# Patient Record
Sex: MALE | Race: WHITE | NOT HISPANIC OR LATINO | Employment: OTHER | URBAN - METROPOLITAN AREA
[De-identification: names, ages, dates, MRNs, and addresses within clinical notes are randomized per-mention and may not be internally consistent; named-entity substitution may affect disease eponyms.]

---

## 2017-09-27 ENCOUNTER — OUTSIDE PLACE OF SERVICE (OUTPATIENT)
Dept: ADMINISTRATIVE | Facility: OTHER | Age: 67
End: 2017-09-27
Payer: OTHER GOVERNMENT

## 2017-09-27 PROCEDURE — 99305 1ST NF CARE MODERATE MDM 35: CPT | Mod: ,,, | Performed by: FAMILY MEDICINE

## 2017-10-25 ENCOUNTER — OUTSIDE PLACE OF SERVICE (OUTPATIENT)
Dept: ADMINISTRATIVE | Facility: OTHER | Age: 67
End: 2017-10-25
Payer: OTHER GOVERNMENT

## 2017-10-25 PROCEDURE — 99308 SBSQ NF CARE LOW MDM 20: CPT | Mod: ,,, | Performed by: FAMILY MEDICINE

## 2017-11-10 ENCOUNTER — OUTSIDE PLACE OF SERVICE (OUTPATIENT)
Dept: ADMINISTRATIVE | Facility: OTHER | Age: 67
End: 2017-11-10
Payer: OTHER GOVERNMENT

## 2017-11-10 PROCEDURE — 99308 SBSQ NF CARE LOW MDM 20: CPT | Mod: ,,, | Performed by: FAMILY MEDICINE

## 2018-09-08 ENCOUNTER — OUTSIDE PLACE OF SERVICE (OUTPATIENT)
Dept: CARDIOLOGY | Facility: CLINIC | Age: 68
End: 2018-09-08
Payer: MEDICARE

## 2018-09-08 PROCEDURE — 93010 ELECTROCARDIOGRAM REPORT: CPT | Mod: S$GLB,,, | Performed by: INTERNAL MEDICINE

## 2019-01-17 ENCOUNTER — OUTSIDE PLACE OF SERVICE (OUTPATIENT)
Dept: ADMINISTRATIVE | Facility: OTHER | Age: 69
End: 2019-01-17
Payer: MEDICARE

## 2019-01-17 PROCEDURE — 99309 SBSQ NF CARE MODERATE MDM 30: CPT | Mod: ,,, | Performed by: FAMILY MEDICINE

## 2019-01-17 PROCEDURE — 99309 PR NURSING FAC CARE, SUBSEQ, SIGNIF COMPLIC: ICD-10-PCS | Mod: ,,, | Performed by: FAMILY MEDICINE

## 2019-01-30 ENCOUNTER — OUTSIDE PLACE OF SERVICE (OUTPATIENT)
Dept: ADMINISTRATIVE | Facility: OTHER | Age: 69
End: 2019-01-30
Payer: MEDICARE

## 2019-01-30 PROCEDURE — 99307 SBSQ NF CARE SF MDM 10: CPT | Mod: ,,, | Performed by: FAMILY MEDICINE

## 2019-01-30 PROCEDURE — 99307 PR NURSING FAC CARE, SUBSEQ, IMPROVING: ICD-10-PCS | Mod: ,,, | Performed by: FAMILY MEDICINE

## 2019-03-27 ENCOUNTER — HOSPITAL ENCOUNTER (INPATIENT)
Facility: HOSPITAL | Age: 69
LOS: 6 days | Discharge: HOME | DRG: 246 | End: 2019-04-02
Attending: EMERGENCY MEDICINE | Admitting: STUDENT IN AN ORGANIZED HEALTH CARE EDUCATION/TRAINING PROGRAM
Payer: MEDICARE

## 2019-03-27 ENCOUNTER — OUTSIDE PLACE OF SERVICE (OUTPATIENT)
Dept: FAMILY MEDICINE | Facility: CLINIC | Age: 69
End: 2019-03-27
Payer: MEDICARE

## 2019-03-27 DIAGNOSIS — J90 PLEURAL EFFUSION: Primary | ICD-10-CM

## 2019-03-27 DIAGNOSIS — E87.6 HYPOKALEMIA: ICD-10-CM

## 2019-03-27 DIAGNOSIS — I21.3 ST ELEVATION MYOCARDIAL INFARCTION (STEMI), UNSPECIFIED ARTERY: ICD-10-CM

## 2019-03-27 DIAGNOSIS — I50.41 ACUTE COMBINED SYSTOLIC AND DIASTOLIC HEART FAILURE: ICD-10-CM

## 2019-03-27 DIAGNOSIS — I21.3 STEMI (ST ELEVATION MYOCARDIAL INFARCTION): ICD-10-CM

## 2019-03-27 DIAGNOSIS — I21.02 ST ELEVATION MYOCARDIAL INFARCTION INVOLVING LEFT ANTERIOR DESCENDING (LAD) CORONARY ARTERY: ICD-10-CM

## 2019-03-27 DIAGNOSIS — R07.9 CHEST PAIN: ICD-10-CM

## 2019-03-27 DIAGNOSIS — R00.0 TACHYCARDIA: ICD-10-CM

## 2019-03-27 DIAGNOSIS — D72.829 LEUKOCYTOSIS, UNSPECIFIED TYPE: ICD-10-CM

## 2019-03-27 DIAGNOSIS — R41.0 DELIRIOUS: ICD-10-CM

## 2019-03-27 DIAGNOSIS — I25.110 CORONARY ARTERY DISEASE INVOLVING NATIVE CORONARY ARTERY OF NATIVE HEART WITH UNSTABLE ANGINA PECTORIS: ICD-10-CM

## 2019-03-27 DIAGNOSIS — J96.01 ACUTE RESPIRATORY FAILURE WITH HYPOXIA: ICD-10-CM

## 2019-03-27 DIAGNOSIS — R07.9 CHEST PAIN, UNSPECIFIED TYPE: ICD-10-CM

## 2019-03-27 LAB
ALBUMIN SERPL BCP-MCNC: 3.2 G/DL (ref 3.5–5.2)
ALP SERPL-CCNC: 76 U/L (ref 55–135)
ALT SERPL W/O P-5'-P-CCNC: 18 U/L (ref 10–44)
ANION GAP SERPL CALC-SCNC: 12 MMOL/L (ref 8–16)
AST SERPL-CCNC: 29 U/L (ref 10–40)
BASOPHILS # BLD AUTO: 0.11 K/UL (ref 0–0.2)
BASOPHILS NFR BLD: 0.7 % (ref 0–1.9)
BILIRUB SERPL-MCNC: 1.1 MG/DL (ref 0.1–1)
BNP SERPL-MCNC: 2283 PG/ML (ref 0–99)
BUN SERPL-MCNC: 19 MG/DL (ref 8–23)
CALCIUM SERPL-MCNC: 8.7 MG/DL (ref 8.7–10.5)
CHLORIDE SERPL-SCNC: 102 MMOL/L (ref 95–110)
CO2 SERPL-SCNC: 24 MMOL/L (ref 23–29)
CREAT SERPL-MCNC: 1.1 MG/DL (ref 0.5–1.4)
DIFFERENTIAL METHOD: ABNORMAL
EOSINOPHIL # BLD AUTO: 1.4 K/UL (ref 0–0.5)
EOSINOPHIL NFR BLD: 9.1 % (ref 0–8)
ERYTHROCYTE [DISTWIDTH] IN BLOOD BY AUTOMATED COUNT: 16.2 % (ref 11.5–14.5)
EST. GFR  (AFRICAN AMERICAN): >60 ML/MIN/1.73 M^2
EST. GFR  (NON AFRICAN AMERICAN): >60 ML/MIN/1.73 M^2
GIANT PLATELETS BLD QL SMEAR: PRESENT
GLUCOSE SERPL-MCNC: 148 MG/DL (ref 70–110)
HCT VFR BLD AUTO: 33.5 % (ref 40–54)
HGB BLD-MCNC: 10.5 G/DL (ref 14–18)
INFLUENZA A, MOLECULAR: NEGATIVE
INFLUENZA B, MOLECULAR: NEGATIVE
LACTATE SERPL-SCNC: 1.9 MMOL/L (ref 0.5–2.2)
LYMPHOCYTES # BLD AUTO: 1.2 K/UL (ref 1–4.8)
LYMPHOCYTES NFR BLD: 8.1 % (ref 18–48)
MAGNESIUM SERPL-MCNC: 2 MG/DL (ref 1.6–2.6)
MCH RBC QN AUTO: 32.6 PG (ref 27–31)
MCHC RBC AUTO-ENTMCNC: 31.3 G/DL (ref 32–36)
MCV RBC AUTO: 104 FL (ref 82–98)
MONOCYTES # BLD AUTO: 1.1 K/UL (ref 0.3–1)
MONOCYTES NFR BLD: 7.4 % (ref 4–15)
NEUTROPHILS # BLD AUTO: 10.9 K/UL (ref 1.8–7.7)
NEUTROPHILS NFR BLD: 74.7 % (ref 38–73)
NRBC BLD-RTO: 0 /100 WBC
PHOSPHATE SERPL-MCNC: 3.2 MG/DL (ref 2.7–4.5)
PLATELET # BLD AUTO: 264 K/UL (ref 150–350)
PMV BLD AUTO: 12.4 FL (ref 9.2–12.9)
POTASSIUM SERPL-SCNC: 3.8 MMOL/L (ref 3.5–5.1)
PROT SERPL-MCNC: 7.4 G/DL (ref 6–8.4)
RBC # BLD AUTO: 3.22 M/UL (ref 4.6–6.2)
SODIUM SERPL-SCNC: 138 MMOL/L (ref 136–145)
SPECIMEN SOURCE: NORMAL
TROPONIN I SERPL DL<=0.01 NG/ML-MCNC: 15.44 NG/ML (ref 0–0.03)
TROPONIN I SERPL DL<=0.01 NG/ML-MCNC: 16.03 NG/ML (ref 0–0.03)
WBC # BLD AUTO: 14.83 K/UL (ref 3.9–12.7)

## 2019-03-27 PROCEDURE — 25000003 PHARM REV CODE 250: Performed by: STUDENT IN AN ORGANIZED HEALTH CARE EDUCATION/TRAINING PROGRAM

## 2019-03-27 PROCEDURE — 83735 ASSAY OF MAGNESIUM: CPT

## 2019-03-27 PROCEDURE — 92928 PRQ TCAT PLMT NTRAC ST 1 LES: CPT | Mod: LD | Performed by: STUDENT IN AN ORGANIZED HEALTH CARE EDUCATION/TRAINING PROGRAM

## 2019-03-27 PROCEDURE — C1753 CATH, INTRAVAS ULTRASOUND: HCPCS | Performed by: STUDENT IN AN ORGANIZED HEALTH CARE EDUCATION/TRAINING PROGRAM

## 2019-03-27 PROCEDURE — 92928 PR STENT: ICD-10-PCS | Mod: LD,,, | Performed by: STUDENT IN AN ORGANIZED HEALTH CARE EDUCATION/TRAINING PROGRAM

## 2019-03-27 PROCEDURE — 83605 ASSAY OF LACTIC ACID: CPT

## 2019-03-27 PROCEDURE — 99308 PR NURSING FAC CARE, SUBSEQ, MINOR COMPLIC: ICD-10-PCS | Mod: ,,, | Performed by: FAMILY MEDICINE

## 2019-03-27 PROCEDURE — C1725 CATH, TRANSLUMIN NON-LASER: HCPCS | Performed by: STUDENT IN AN ORGANIZED HEALTH CARE EDUCATION/TRAINING PROGRAM

## 2019-03-27 PROCEDURE — 36415 COLL VENOUS BLD VENIPUNCTURE: CPT

## 2019-03-27 PROCEDURE — 20000000 HC ICU ROOM

## 2019-03-27 PROCEDURE — C1894 INTRO/SHEATH, NON-LASER: HCPCS | Performed by: STUDENT IN AN ORGANIZED HEALTH CARE EDUCATION/TRAINING PROGRAM

## 2019-03-27 PROCEDURE — 93010 ELECTROCARDIOGRAM REPORT: CPT | Mod: ,,, | Performed by: STUDENT IN AN ORGANIZED HEALTH CARE EDUCATION/TRAINING PROGRAM

## 2019-03-27 PROCEDURE — 99152 PR MOD CONSCIOUS SEDATION, SAME PHYS, 5+ YRS, FIRST 15 MIN: ICD-10-PCS | Mod: ,,, | Performed by: STUDENT IN AN ORGANIZED HEALTH CARE EDUCATION/TRAINING PROGRAM

## 2019-03-27 PROCEDURE — 99223 1ST HOSP IP/OBS HIGH 75: CPT | Mod: 25,,, | Performed by: STUDENT IN AN ORGANIZED HEALTH CARE EDUCATION/TRAINING PROGRAM

## 2019-03-27 PROCEDURE — 93005 ELECTROCARDIOGRAM TRACING: CPT

## 2019-03-27 PROCEDURE — 99223 PR INITIAL HOSPITAL CARE,LEVL III: ICD-10-PCS | Mod: 25,,, | Performed by: STUDENT IN AN ORGANIZED HEALTH CARE EDUCATION/TRAINING PROGRAM

## 2019-03-27 PROCEDURE — C1769 GUIDE WIRE: HCPCS | Performed by: STUDENT IN AN ORGANIZED HEALTH CARE EDUCATION/TRAINING PROGRAM

## 2019-03-27 PROCEDURE — 85025 COMPLETE CBC W/AUTO DIFF WBC: CPT

## 2019-03-27 PROCEDURE — 99308 SBSQ NF CARE LOW MDM 20: CPT | Mod: ,,, | Performed by: FAMILY MEDICINE

## 2019-03-27 PROCEDURE — 83880 ASSAY OF NATRIURETIC PEPTIDE: CPT

## 2019-03-27 PROCEDURE — 99152 MOD SED SAME PHYS/QHP 5/>YRS: CPT | Mod: ,,, | Performed by: STUDENT IN AN ORGANIZED HEALTH CARE EDUCATION/TRAINING PROGRAM

## 2019-03-27 PROCEDURE — 27201423 OPTIME MED/SURG SUP & DEVICES STERILE SUPPLY: Performed by: STUDENT IN AN ORGANIZED HEALTH CARE EDUCATION/TRAINING PROGRAM

## 2019-03-27 PROCEDURE — 93458 L HRT ARTERY/VENTRICLE ANGIO: CPT | Mod: 26,59,, | Performed by: STUDENT IN AN ORGANIZED HEALTH CARE EDUCATION/TRAINING PROGRAM

## 2019-03-27 PROCEDURE — 92928 PRQ TCAT PLMT NTRAC ST 1 LES: CPT | Mod: LD,,, | Performed by: STUDENT IN AN ORGANIZED HEALTH CARE EDUCATION/TRAINING PROGRAM

## 2019-03-27 PROCEDURE — 99900035 HC TECH TIME PER 15 MIN (STAT)

## 2019-03-27 PROCEDURE — 93458 L HRT ARTERY/VENTRICLE ANGIO: CPT | Performed by: STUDENT IN AN ORGANIZED HEALTH CARE EDUCATION/TRAINING PROGRAM

## 2019-03-27 PROCEDURE — 84100 ASSAY OF PHOSPHORUS: CPT

## 2019-03-27 PROCEDURE — 80053 COMPREHEN METABOLIC PANEL: CPT

## 2019-03-27 PROCEDURE — 25000003 PHARM REV CODE 250: Performed by: EMERGENCY MEDICINE

## 2019-03-27 PROCEDURE — C1887 CATHETER, GUIDING: HCPCS | Performed by: STUDENT IN AN ORGANIZED HEALTH CARE EDUCATION/TRAINING PROGRAM

## 2019-03-27 PROCEDURE — C1874 STENT, COATED/COV W/DEL SYS: HCPCS | Performed by: STUDENT IN AN ORGANIZED HEALTH CARE EDUCATION/TRAINING PROGRAM

## 2019-03-27 PROCEDURE — 93010 EKG 12-LEAD: ICD-10-PCS | Mod: ,,, | Performed by: STUDENT IN AN ORGANIZED HEALTH CARE EDUCATION/TRAINING PROGRAM

## 2019-03-27 PROCEDURE — 87502 INFLUENZA DNA AMP PROBE: CPT

## 2019-03-27 PROCEDURE — 87040 BLOOD CULTURE FOR BACTERIA: CPT

## 2019-03-27 PROCEDURE — 27000190 HC CPAP FULL FACE MASK W/VALVE

## 2019-03-27 PROCEDURE — 93458 PR CATH PLACE/CORON ANGIO, IMG SUPER/INTERP,W LEFT HEART VENTRICULOGRAPHY: ICD-10-PCS | Mod: 26,59,, | Performed by: STUDENT IN AN ORGANIZED HEALTH CARE EDUCATION/TRAINING PROGRAM

## 2019-03-27 PROCEDURE — 84484 ASSAY OF TROPONIN QUANT: CPT | Mod: 91

## 2019-03-27 PROCEDURE — 63600175 PHARM REV CODE 636 W HCPCS: Performed by: STUDENT IN AN ORGANIZED HEALTH CARE EDUCATION/TRAINING PROGRAM

## 2019-03-27 PROCEDURE — 99291 CRITICAL CARE FIRST HOUR: CPT

## 2019-03-27 DEVICE — XIENCE SIERRA™ EVEROLIMUS ELUTING CORONARY STENT SYSTEM 3.25 MM X 33 MM / RAPID-EXCHANGE
Type: IMPLANTABLE DEVICE | Site: CORONARY | Status: FUNCTIONAL
Brand: XIENCE SIERRA™

## 2019-03-27 RX ORDER — FUROSEMIDE 10 MG/ML
INJECTION INTRAMUSCULAR; INTRAVENOUS
Status: DISCONTINUED | OUTPATIENT
Start: 2019-03-27 | End: 2019-03-27 | Stop reason: HOSPADM

## 2019-03-27 RX ORDER — VANCOMYCIN HCL IN 5 % DEXTROSE 1G/250ML
15 PLASTIC BAG, INJECTION (ML) INTRAVENOUS
Status: ACTIVE | OUTPATIENT
Start: 2019-03-27 | End: 2019-03-28

## 2019-03-27 RX ORDER — LORAZEPAM 1 MG/1
1 TABLET ORAL EVERY 6 HOURS PRN
Status: DISCONTINUED | OUTPATIENT
Start: 2019-03-28 | End: 2019-03-27

## 2019-03-27 RX ORDER — FENTANYL CITRATE 50 UG/ML
INJECTION, SOLUTION INTRAMUSCULAR; INTRAVENOUS
Status: DISCONTINUED | OUTPATIENT
Start: 2019-03-27 | End: 2019-03-27 | Stop reason: HOSPADM

## 2019-03-27 RX ORDER — PHENYLEPHRINE HCL IN 0.9% NACL 1 MG/10 ML
SYRINGE (ML) INTRAVENOUS
Status: DISCONTINUED | OUTPATIENT
Start: 2019-03-27 | End: 2019-03-27 | Stop reason: HOSPADM

## 2019-03-27 RX ORDER — ALPRAZOLAM 0.25 MG/1
0.5 TABLET ORAL EVERY 6 HOURS PRN
Status: DISCONTINUED | OUTPATIENT
Start: 2019-03-27 | End: 2019-03-28

## 2019-03-27 RX ORDER — ACETAMINOPHEN 325 MG/1
650 TABLET ORAL EVERY 6 HOURS PRN
Status: DISCONTINUED | OUTPATIENT
Start: 2019-03-28 | End: 2019-03-28

## 2019-03-27 RX ORDER — MIDAZOLAM HYDROCHLORIDE 1 MG/ML
INJECTION, SOLUTION INTRAMUSCULAR; INTRAVENOUS
Status: DISCONTINUED | OUTPATIENT
Start: 2019-03-27 | End: 2019-03-27 | Stop reason: HOSPADM

## 2019-03-27 RX ORDER — HYDRALAZINE HYDROCHLORIDE 20 MG/ML
10 INJECTION INTRAMUSCULAR; INTRAVENOUS EVERY 6 HOURS PRN
Status: DISCONTINUED | OUTPATIENT
Start: 2019-03-27 | End: 2019-04-02 | Stop reason: HOSPADM

## 2019-03-27 RX ORDER — ATORVASTATIN CALCIUM 40 MG/1
40 TABLET, FILM COATED ORAL DAILY
Status: DISCONTINUED | OUTPATIENT
Start: 2019-03-28 | End: 2019-04-02 | Stop reason: HOSPADM

## 2019-03-27 RX ORDER — ACETAMINOPHEN 325 MG/1
650 TABLET ORAL EVERY 4 HOURS PRN
Status: DISCONTINUED | OUTPATIENT
Start: 2019-03-27 | End: 2019-03-27

## 2019-03-27 RX ORDER — ZOLPIDEM TARTRATE 5 MG/1
5 TABLET ORAL NIGHTLY PRN
Status: DISCONTINUED | OUTPATIENT
Start: 2019-03-28 | End: 2019-03-28

## 2019-03-27 RX ORDER — NAPROXEN SODIUM 220 MG/1
81 TABLET, FILM COATED ORAL DAILY
Status: DISCONTINUED | OUTPATIENT
Start: 2019-03-28 | End: 2019-04-02 | Stop reason: HOSPADM

## 2019-03-27 RX ORDER — ONDANSETRON 2 MG/ML
4 INJECTION INTRAMUSCULAR; INTRAVENOUS EVERY 12 HOURS PRN
Status: DISCONTINUED | OUTPATIENT
Start: 2019-03-27 | End: 2019-04-02 | Stop reason: HOSPADM

## 2019-03-27 RX ORDER — HEPARIN SODIUM 1000 [USP'U]/ML
INJECTION, SOLUTION INTRAVENOUS; SUBCUTANEOUS
Status: DISCONTINUED | OUTPATIENT
Start: 2019-03-27 | End: 2019-03-27 | Stop reason: HOSPADM

## 2019-03-27 RX ADMIN — TICAGRELOR 180 MG: 90 TABLET ORAL at 09:03

## 2019-03-27 RX ADMIN — SODIUM CHLORIDE 500 ML: 0.9 INJECTION, SOLUTION INTRAVENOUS at 09:03

## 2019-03-27 RX ADMIN — ALPRAZOLAM 0.5 MG: 0.25 TABLET ORAL at 11:03

## 2019-03-27 RX ADMIN — ZOLPIDEM TARTRATE 5 MG: 5 TABLET ORAL at 11:03

## 2019-03-27 NOTE — Clinical Note
Catheter  proximal left anterior descending artery. The vessel(s) were injected and visualized in multiple views.

## 2019-03-27 NOTE — Clinical Note
Catheter is inserted into the left ventricle. Hand injected The vessel(s) were injected and visualized in multiple views. Hemodynamics performed.

## 2019-03-27 NOTE — Clinical Note
Catheter is inserted into the distal left anterior descending artery. The vessel(s) were injected and visualized in multiple views.

## 2019-03-28 DIAGNOSIS — R07.9 CHEST PAIN, UNSPECIFIED TYPE: Primary | ICD-10-CM

## 2019-03-28 PROBLEM — I25.10 CORONARY ARTERY DISEASE INVOLVING NATIVE CORONARY ARTERY: Status: ACTIVE | Noted: 2019-03-28

## 2019-03-28 PROBLEM — E87.6 HYPOKALEMIA: Status: ACTIVE | Noted: 2019-03-28

## 2019-03-28 PROBLEM — R00.0 TACHYCARDIA: Status: ACTIVE | Noted: 2019-03-28

## 2019-03-28 PROBLEM — I21.3 STEMI (ST ELEVATION MYOCARDIAL INFARCTION): Status: ACTIVE | Noted: 2019-03-28

## 2019-03-28 PROBLEM — D72.829 LEUKOCYTOSIS: Status: ACTIVE | Noted: 2019-03-28

## 2019-03-28 PROBLEM — J96.00 ACUTE RESPIRATORY FAILURE: Status: ACTIVE | Noted: 2019-03-28

## 2019-03-28 LAB
ALLENS TEST: ABNORMAL
ANION GAP SERPL CALC-SCNC: 13 MMOL/L (ref 8–16)
AV INDEX (PROSTH): 0.61
AV MEAN GRADIENT: 2.96 MMHG
AV PEAK GRADIENT: 4 MMHG
AV VALVE AREA: 1.7 CM2
AV VELOCITY RATIO: 0.6
BASOPHILS # BLD AUTO: 0.1 K/UL (ref 0–0.2)
BASOPHILS NFR BLD: 0.5 % (ref 0–1.9)
BSA FOR ECHO PROCEDURE: 1.77 M2
BUN SERPL-MCNC: 18 MG/DL (ref 8–23)
CALCIUM SERPL-MCNC: 8.8 MG/DL (ref 8.7–10.5)
CHLORIDE SERPL-SCNC: 102 MMOL/L (ref 95–110)
CO2 SERPL-SCNC: 22 MMOL/L (ref 23–29)
CREAT SERPL-MCNC: 1 MG/DL (ref 0.5–1.4)
CV ECHO LV RWT: 0.36 CM
DELSYS: ABNORMAL
DIFFERENTIAL METHOD: ABNORMAL
DOP CALC AO PEAK VEL: 1 M/S
DOP CALC AO VTI: 17.16 CM
DOP CALC LVOT AREA: 2.77 CM2
DOP CALC LVOT DIAMETER: 1.88 CM
DOP CALC LVOT PEAK VEL: 0.6 M/S
DOP CALC LVOT STROKE VOLUME: 29.22 CM3
DOP CALCLVOT PEAK VEL VTI: 10.53 CM
ECHO LV POSTERIOR WALL: 0.91 CM (ref 0.6–1.1)
EOSINOPHIL # BLD AUTO: 0.5 K/UL (ref 0–0.5)
EOSINOPHIL NFR BLD: 2.3 % (ref 0–8)
ERYTHROCYTE [DISTWIDTH] IN BLOOD BY AUTOMATED COUNT: 16.1 % (ref 11.5–14.5)
EST. GFR  (AFRICAN AMERICAN): >60 ML/MIN/1.73 M^2
EST. GFR  (NON AFRICAN AMERICAN): >60 ML/MIN/1.73 M^2
FRACTIONAL SHORTENING: 2 % (ref 28–44)
GLUCOSE SERPL-MCNC: 190 MG/DL (ref 70–110)
HCO3 UR-SCNC: 24.2 MMOL/L (ref 24–28)
HCT VFR BLD AUTO: 34.1 % (ref 40–54)
HGB BLD-MCNC: 10.8 G/DL (ref 14–18)
INTERVENTRICULAR SEPTUM: 1.05 CM (ref 0.6–1.1)
IVRT: 0.1 MSEC
LA MAJOR: 5.33 CM
LA MINOR: 5.44 CM
LA WIDTH: 4.4 CM
LACTATE SERPL-SCNC: 1.8 MMOL/L (ref 0.5–2.2)
LEFT ATRIUM SIZE: 4.49 CM
LEFT ATRIUM VOLUME INDEX: 50.6 ML/M2
LEFT ATRIUM VOLUME: 90.42 CM3
LEFT INTERNAL DIMENSION IN SYSTOLE: 4.88 CM (ref 2.1–4)
LEFT VENTRICLE DIASTOLIC VOLUME INDEX: 66.18 ML/M2
LEFT VENTRICLE DIASTOLIC VOLUME: 118.18 ML
LEFT VENTRICLE MASS INDEX: 99.2 G/M2
LEFT VENTRICLE SYSTOLIC VOLUME INDEX: 62.6 ML/M2
LEFT VENTRICLE SYSTOLIC VOLUME: 111.71 ML
LEFT VENTRICULAR INTERNAL DIMENSION IN DIASTOLE: 5 CM (ref 3.5–6)
LEFT VENTRICULAR MASS: 177.11 G
LYMPHOCYTES # BLD AUTO: 1.3 K/UL (ref 1–4.8)
LYMPHOCYTES NFR BLD: 6.4 % (ref 18–48)
MCH RBC QN AUTO: 32.5 PG (ref 27–31)
MCHC RBC AUTO-ENTMCNC: 31.7 G/DL (ref 32–36)
MCV RBC AUTO: 103 FL (ref 82–98)
MODE: ABNORMAL
MONOCYTES # BLD AUTO: 0.8 K/UL (ref 0.3–1)
MONOCYTES NFR BLD: 4.2 % (ref 4–15)
NEUTROPHILS # BLD AUTO: 16.9 K/UL (ref 1.8–7.7)
NEUTROPHILS NFR BLD: 86 % (ref 38–73)
PCO2 BLDA: 30.3 MMHG (ref 35–45)
PH SMN: 7.51 [PH] (ref 7.35–7.45)
PISA TR MAX VEL: 2.36 M/S
PLATELET # BLD AUTO: 275 K/UL (ref 150–350)
PMV BLD AUTO: 12.3 FL (ref 9.2–12.9)
PO2 BLDA: 116 MMHG (ref 80–100)
POC BE: 1 MMOL/L
POC SATURATED O2: 99 % (ref 95–100)
POC TCO2: 25 MMOL/L (ref 23–27)
POTASSIUM SERPL-SCNC: 3.3 MMOL/L (ref 3.5–5.1)
PULM VEIN S/D RATIO: 1.1
PV PEAK D VEL: 0.21 M/S
PV PEAK S VEL: 0.23 M/S
RA MAJOR: 3.84 CM
RA PRESSURE: 3 MMHG
RBC # BLD AUTO: 3.32 M/UL (ref 4.6–6.2)
SAMPLE: ABNORMAL
SITE: ABNORMAL
SODIUM SERPL-SCNC: 137 MMOL/L (ref 136–145)
TDI LATERAL: 0.06
TR MAX PG: 22.28 MMHG
TV REST PULMONARY ARTERY PRESSURE: 25 MMHG
WBC # BLD AUTO: 19.69 K/UL (ref 3.9–12.7)

## 2019-03-28 PROCEDURE — 93005 ELECTROCARDIOGRAM TRACING: CPT

## 2019-03-28 PROCEDURE — 94640 AIRWAY INHALATION TREATMENT: CPT

## 2019-03-28 PROCEDURE — 83605 ASSAY OF LACTIC ACID: CPT

## 2019-03-28 PROCEDURE — 82803 BLOOD GASES ANY COMBINATION: CPT

## 2019-03-28 PROCEDURE — 94761 N-INVAS EAR/PLS OXIMETRY MLT: CPT

## 2019-03-28 PROCEDURE — 99900035 HC TECH TIME PER 15 MIN (STAT)

## 2019-03-28 PROCEDURE — 20000000 HC ICU ROOM

## 2019-03-28 PROCEDURE — 85025 COMPLETE CBC W/AUTO DIFF WBC: CPT

## 2019-03-28 PROCEDURE — 99233 SBSQ HOSP IP/OBS HIGH 50: CPT | Mod: 25,,, | Performed by: STUDENT IN AN ORGANIZED HEALTH CARE EDUCATION/TRAINING PROGRAM

## 2019-03-28 PROCEDURE — 93010 ELECTROCARDIOGRAM REPORT: CPT | Mod: 76,,, | Performed by: STUDENT IN AN ORGANIZED HEALTH CARE EDUCATION/TRAINING PROGRAM

## 2019-03-28 PROCEDURE — 93010 ELECTROCARDIOGRAM REPORT: CPT | Mod: ,,, | Performed by: STUDENT IN AN ORGANIZED HEALTH CARE EDUCATION/TRAINING PROGRAM

## 2019-03-28 PROCEDURE — 36415 COLL VENOUS BLD VENIPUNCTURE: CPT

## 2019-03-28 PROCEDURE — 63600175 PHARM REV CODE 636 W HCPCS: Performed by: EMERGENCY MEDICINE

## 2019-03-28 PROCEDURE — 63600175 PHARM REV CODE 636 W HCPCS: Performed by: NURSE PRACTITIONER

## 2019-03-28 PROCEDURE — 27000221 HC OXYGEN, UP TO 24 HOURS

## 2019-03-28 PROCEDURE — 25000003 PHARM REV CODE 250: Performed by: EMERGENCY MEDICINE

## 2019-03-28 PROCEDURE — 99233 PR SUBSEQUENT HOSPITAL CARE,LEVL III: ICD-10-PCS | Mod: 25,,, | Performed by: STUDENT IN AN ORGANIZED HEALTH CARE EDUCATION/TRAINING PROGRAM

## 2019-03-28 PROCEDURE — 87040 BLOOD CULTURE FOR BACTERIA: CPT

## 2019-03-28 PROCEDURE — 36600 WITHDRAWAL OF ARTERIAL BLOOD: CPT

## 2019-03-28 PROCEDURE — 63600175 PHARM REV CODE 636 W HCPCS: Performed by: STUDENT IN AN ORGANIZED HEALTH CARE EDUCATION/TRAINING PROGRAM

## 2019-03-28 PROCEDURE — 80048 BASIC METABOLIC PNL TOTAL CA: CPT

## 2019-03-28 PROCEDURE — 25000003 PHARM REV CODE 250: Performed by: STUDENT IN AN ORGANIZED HEALTH CARE EDUCATION/TRAINING PROGRAM

## 2019-03-28 PROCEDURE — 93010 EKG 12-LEAD: ICD-10-PCS | Mod: ,,, | Performed by: STUDENT IN AN ORGANIZED HEALTH CARE EDUCATION/TRAINING PROGRAM

## 2019-03-28 PROCEDURE — 94660 CPAP INITIATION&MGMT: CPT

## 2019-03-28 PROCEDURE — 25000242 PHARM REV CODE 250 ALT 637 W/ HCPCS: Performed by: STUDENT IN AN ORGANIZED HEALTH CARE EDUCATION/TRAINING PROGRAM

## 2019-03-28 PROCEDURE — 25000003 PHARM REV CODE 250: Performed by: NURSE PRACTITIONER

## 2019-03-28 PROCEDURE — 84145 PROCALCITONIN (PCT): CPT

## 2019-03-28 RX ORDER — IPRATROPIUM BROMIDE AND ALBUTEROL SULFATE 2.5; .5 MG/3ML; MG/3ML
3 SOLUTION RESPIRATORY (INHALATION) EVERY 4 HOURS
Status: DISCONTINUED | OUTPATIENT
Start: 2019-03-28 | End: 2019-03-28

## 2019-03-28 RX ORDER — ALPRAZOLAM 0.25 MG/1
0.5 TABLET ORAL EVERY 6 HOURS PRN
Status: DISCONTINUED | OUTPATIENT
Start: 2019-03-29 | End: 2019-03-29

## 2019-03-28 RX ORDER — POTASSIUM CHLORIDE 20 MEQ/1
40 TABLET, EXTENDED RELEASE ORAL ONCE
Status: DISCONTINUED | OUTPATIENT
Start: 2019-03-28 | End: 2019-03-28

## 2019-03-28 RX ORDER — IPRATROPIUM BROMIDE AND ALBUTEROL SULFATE 2.5; .5 MG/3ML; MG/3ML
3 SOLUTION RESPIRATORY (INHALATION) EVERY 4 HOURS
Status: DISCONTINUED | OUTPATIENT
Start: 2019-03-28 | End: 2019-04-02 | Stop reason: HOSPADM

## 2019-03-28 RX ORDER — ALPRAZOLAM 0.25 MG/1
0.25 TABLET ORAL 2 TIMES DAILY PRN
Status: DISCONTINUED | OUTPATIENT
Start: 2019-03-28 | End: 2019-03-28

## 2019-03-28 RX ORDER — RAMELTEON 8 MG/1
8 TABLET ORAL NIGHTLY PRN
Status: DISCONTINUED | OUTPATIENT
Start: 2019-03-28 | End: 2019-03-28

## 2019-03-28 RX ORDER — ACETAMINOPHEN 325 MG/1
650 TABLET ORAL EVERY 6 HOURS PRN
Status: DISCONTINUED | OUTPATIENT
Start: 2019-03-28 | End: 2019-04-02 | Stop reason: HOSPADM

## 2019-03-28 RX ORDER — FUROSEMIDE 10 MG/ML
80 INJECTION INTRAMUSCULAR; INTRAVENOUS ONCE
Status: COMPLETED | OUTPATIENT
Start: 2019-03-28 | End: 2019-03-28

## 2019-03-28 RX ORDER — POTASSIUM CHLORIDE 7.45 MG/ML
10 INJECTION INTRAVENOUS
Status: COMPLETED | OUTPATIENT
Start: 2019-03-28 | End: 2019-03-28

## 2019-03-28 RX ORDER — TIOTROPIUM BROMIDE 18 UG/1
1 CAPSULE ORAL; RESPIRATORY (INHALATION) DAILY
Status: DISCONTINUED | OUTPATIENT
Start: 2019-03-29 | End: 2019-04-02 | Stop reason: HOSPADM

## 2019-03-28 RX ORDER — LEVOFLOXACIN 5 MG/ML
750 INJECTION, SOLUTION INTRAVENOUS
Status: DISCONTINUED | OUTPATIENT
Start: 2019-03-28 | End: 2019-04-01

## 2019-03-28 RX ORDER — LEVOFLOXACIN 5 MG/ML
500 INJECTION, SOLUTION INTRAVENOUS
Status: DISCONTINUED | OUTPATIENT
Start: 2019-03-28 | End: 2019-03-28

## 2019-03-28 RX ORDER — ZOLPIDEM TARTRATE 5 MG/1
5 TABLET ORAL NIGHTLY PRN
Status: DISCONTINUED | OUTPATIENT
Start: 2019-03-29 | End: 2019-04-02 | Stop reason: HOSPADM

## 2019-03-28 RX ORDER — VANCOMYCIN HCL IN 5 % DEXTROSE 1G/250ML
15 PLASTIC BAG, INJECTION (ML) INTRAVENOUS
Status: DISCONTINUED | OUTPATIENT
Start: 2019-03-28 | End: 2019-03-28 | Stop reason: DRUGHIGH

## 2019-03-28 RX ORDER — POTASSIUM CHLORIDE 20 MEQ/1
40 TABLET, EXTENDED RELEASE ORAL
Status: DISCONTINUED | OUTPATIENT
Start: 2019-03-28 | End: 2019-03-28

## 2019-03-28 RX ORDER — FLUTICASONE FUROATE AND VILANTEROL 100; 25 UG/1; UG/1
1 POWDER RESPIRATORY (INHALATION) DAILY
Status: DISCONTINUED | OUTPATIENT
Start: 2019-03-29 | End: 2019-04-02 | Stop reason: HOSPADM

## 2019-03-28 RX ORDER — ALPRAZOLAM 0.25 MG/1
0.5 TABLET ORAL EVERY 6 HOURS
Status: DISCONTINUED | OUTPATIENT
Start: 2019-03-29 | End: 2019-03-28

## 2019-03-28 RX ORDER — HALOPERIDOL 5 MG/ML
5 INJECTION INTRAMUSCULAR EVERY 6 HOURS PRN
Status: DISCONTINUED | OUTPATIENT
Start: 2019-03-28 | End: 2019-04-02 | Stop reason: HOSPADM

## 2019-03-28 RX ORDER — POTASSIUM CHLORIDE 20 MEQ/1
40 TABLET, EXTENDED RELEASE ORAL ONCE
Status: DISCONTINUED | OUTPATIENT
Start: 2019-03-28 | End: 2019-03-28 | Stop reason: SDUPTHER

## 2019-03-28 RX ADMIN — HALOPERIDOL LACTATE 5 MG: 5 INJECTION, SOLUTION INTRAMUSCULAR at 12:03

## 2019-03-28 RX ADMIN — FUROSEMIDE 80 MG: 10 INJECTION, SOLUTION INTRAMUSCULAR; INTRAVENOUS at 09:03

## 2019-03-28 RX ADMIN — POTASSIUM CHLORIDE 10 MEQ: 7.46 INJECTION, SOLUTION INTRAVENOUS at 06:03

## 2019-03-28 RX ADMIN — POTASSIUM CHLORIDE 10 MEQ: 7.46 INJECTION, SOLUTION INTRAVENOUS at 05:03

## 2019-03-28 RX ADMIN — TICAGRELOR 90 MG: 90 TABLET ORAL at 08:03

## 2019-03-28 RX ADMIN — POTASSIUM CHLORIDE 10 MEQ: 7.46 INJECTION, SOLUTION INTRAVENOUS at 11:03

## 2019-03-28 RX ADMIN — IPRATROPIUM BROMIDE AND ALBUTEROL SULFATE 3 ML: .5; 3 SOLUTION RESPIRATORY (INHALATION) at 07:03

## 2019-03-28 RX ADMIN — ATORVASTATIN CALCIUM 40 MG: 40 TABLET, FILM COATED ORAL at 08:03

## 2019-03-28 RX ADMIN — ACETAMINOPHEN 650 MG: 325 TABLET ORAL at 12:03

## 2019-03-28 RX ADMIN — ALPRAZOLAM 0.5 MG: 0.25 TABLET ORAL at 08:03

## 2019-03-28 RX ADMIN — HALOPERIDOL LACTATE 5 MG: 5 INJECTION, SOLUTION INTRAMUSCULAR at 09:03

## 2019-03-28 RX ADMIN — METOPROLOL SUCCINATE 12.5 MG: 25 TABLET, EXTENDED RELEASE ORAL at 08:03

## 2019-03-28 RX ADMIN — PIPERACILLIN AND TAZOBACTAM 4.5 G: 4; .5 INJECTION, POWDER, LYOPHILIZED, FOR SOLUTION INTRAVENOUS; PARENTERAL at 09:03

## 2019-03-28 RX ADMIN — PIPERACILLIN AND TAZOBACTAM 4.5 G: 4; .5 INJECTION, POWDER, LYOPHILIZED, FOR SOLUTION INTRAVENOUS; PARENTERAL at 01:03

## 2019-03-28 RX ADMIN — PIPERACILLIN AND TAZOBACTAM 4.5 G: 4; .5 INJECTION, POWDER, LYOPHILIZED, FOR SOLUTION INTRAVENOUS; PARENTERAL at 03:03

## 2019-03-28 RX ADMIN — POTASSIUM CHLORIDE 10 MEQ: 7.46 INJECTION, SOLUTION INTRAVENOUS at 01:03

## 2019-03-28 RX ADMIN — ASPIRIN 81 MG 81 MG: 81 TABLET ORAL at 08:03

## 2019-03-28 RX ADMIN — ALPRAZOLAM 0.25 MG: 0.25 TABLET ORAL at 05:03

## 2019-03-28 RX ADMIN — TICAGRELOR 90 MG: 90 TABLET ORAL at 09:03

## 2019-03-28 NOTE — PLAN OF CARE
Problem: Adult Inpatient Plan of Care  Goal: Plan of Care Review  Outcome: Ongoing (interventions implemented as appropriate)  Pt states short of breath, place bipap 10/5/50% back on. Will continue to monitor.

## 2019-03-28 NOTE — NURSING
Patient transferred to room 258 from the cath lab,TR and at right hand with 12 cc air. Patient on BIPAP, agitated, anxious and restless. Using abdominal and accessory muscle to breathe, coarse crackles in all lung fields, labored breathing, lasix given in cath lab,  Condom cath applied, patient needs frequent reorientation, refusing to keep the BIPAP on,daughter and Dr. Quiroz at bedside.   Patient keep trying to take the BIPAP off several times and taking it a part. Bilateral wrist soft restraints applied as per order.

## 2019-03-28 NOTE — ASSESSMENT & PLAN NOTE
-presented with chest pain and JERMAINE to anterior leads  -emergent LHC with LAD CHUN x 3; repeat EKG with continued JERMAINE to anterior leads with slight improvement; currently chest pain free  -will continue to monitor EKGs   -continue GDMT with DAPT, BB and statin therapy  -echocardiogram today

## 2019-03-28 NOTE — PROGRESS NOTES
"Vancomycin Dosing and Monitoring Pharmacy Protocol    Micky Ochoa is a 68 y.o. male    Height: 5' 9" (1.753 m)   Wt Readings from Last 3 Encounters:   03/28/19 64.4 kg (142 lb)       Temp Readings from Last 3 Encounters:   03/28/19 (!) 100.4 °F (38 °C)      Lab Results   Component Value Date/Time    WBC 19.69 (H) 03/28/2019 09:47 AM    WBC 14.83 (H) 03/27/2019 09:06 PM      Lab Results   Component Value Date/Time    CREATININE 1.0 03/28/2019 01:49 AM    CREATININE 1.1 03/27/2019 09:06 PM      Lab Results   Component Value Date/Time    LACTATE 1.8 03/28/2019 01:49 AM    LACTATE 1.9 03/27/2019 09:03 PM       Serum creatinine: 1 mg/dL 03/28/19 0149  Estimated creatinine clearance: 63.5 mL/min    Antibiotics (From admission, onward)      Start     Stop Route Frequency Ordered    03/29/19 1400  vancomycin (VANCOCIN) 1,750 mg in dextrose 5 % 500 mL IVPB      -- IV Every 24 hours (non-standard times) 03/28/19 1308    03/28/19 1515  levoFLOXacin 500 mg/100 mL IVPB 500 mg      -- IV Every 24 hours (non-standard times) 03/28/19 1414    03/28/19 1400  piperacillin-tazobactam 4.5 g in dextrose 5 % 100 mL IVPB (ready to mix system)      -- IV Every 8 hours (non-standard times) 03/28/19 1240    03/28/19 1400  vancomycin (VANCOCIN) 1,750 mg in dextrose 5 % 500 mL IVPB  (Vancomycin IVPB with levels panel)      -- IV Once 03/28/19 1304    03/27/19 2130  vancomycin in dextrose 5 % 1 gram/250 mL IVPB 1,000 mg  (ED Adult Sepsis Treatment)      03/28 0929 IV ED 1 Time 03/27/19 2124          Antifungals (From admission, onward)      None            Microbiology Results (last 7 days)       Procedure Component Value Units Date/Time    Blood culture x two cultures. Draw prior to antibiotics. [[id number]] Collected:  03/28/19 0031    Order Status:  Completed Specimen:  Blood Updated:  03/28/19 1345     Blood Culture, Routine No Growth to date    Narrative:       Aerobic and anaerobic    Blood culture [[id number]] Collected:  03/27/19 2103    " Order Status:  Completed Specimen:  Blood Updated:  19 1345     Blood Culture, Routine No Growth to date    Influenza A & B by Molecular [[id number]] Collected:  19    Order Status:  Completed Specimen:  Nasopharyngeal Swab Updated:  19     Influenza A, Molecular Negative     Influenza B, Molecular Negative     Flu A & B Source Nasal swab    Blood culture x two cultures. Draw prior to antibiotics. [[id number]] Collected:  19    Order Status:  Sent Specimen:  Blood from Peripheral, Wrist, Right Updated:  19            Indication/Target trough:   Pneumonia (Target trough: 15-20mcg/ml)    Hemodialysis:   N/A    Dosing Weight:   Wt Readings from Last 1 Encounters:   19 64.4 kg (142 lb)       Last Vancomycin dose: N/A   Date/Time given: N/A         Vancomycin level:  No results for input(s): VANCOMYCIN-TROUGH in the last 72 hours.  No results for input(s): VANCOMYCIN, RANDOM in the last 72 hours.    Per Protocol Initial/Adjustments Dosin. Initial/Adjustment Dose: NO CHANGE, continue the same Vancomycin dose of 1750mg q24hr  2. Vancomycin Trough Level will be drawn on 3/30 1300date/time     Pharmacy will continue to follow.    Please contact if you have any further questions. Thank you.    Yevgeniy Rodriguez, PharmD  [phone]

## 2019-03-28 NOTE — H&P
[facility]  Cardiology  History and Physical     Patient Name: Micky Ochoa  MRN: [id number]  Admission Date: 3/27/2019  Code Status: No Order   Attending Provider: Abdiel Perez MD   Primary Care Physician: Higinio Brush MD  Principal Problem:<principal problem not specified>    Patient information was obtained from patient, relative(s), past medical records and ER records.     Subjective:     Chief Complaint:  Chest pain     HPI: presented with acute chest pressure. No prior records in [facility] EMR. ecg was significant for acute JERMAINE in the v1-3 and reciporal ST depression in lateral leads.  Will call code stemi    Past Medical History:   Diagnosis Date    Anemia     COPD (chronic obstructive pulmonary disease)     Dementia     GERD (gastroesophageal reflux disease)     Migraine headache     Neuropathy     Pancreatitis, chronic        Past Surgical History:   Procedure Laterality Date    TONSILLECTOMY         Review of patient's allergies indicates:   Allergen Reactions    Codeine        No current facility-administered medications on file prior to encounter.      No current outpatient medications on file prior to encounter.     Family History     None        Tobacco Use    Smoking status: Former Smoker   Substance and Sexual Activity    Alcohol use: Not Currently    Drug use: Not on file    Sexual activity: Not on file     Review of Systems   Unable to perform ROS: dementia     Objective:     Vital Signs (Most Recent):  Temp: (!) 100.8 °F (38.2 °C) (03/27/19 2100)  Pulse: (!) 124 (03/27/19 2100)  Resp: (!) 36 (03/27/19 2100)  BP: (!) 113/59 (03/27/19 2051)  SpO2: 100 % (03/27/19 2250) Vital Signs (24h Range):  Temp:  [100.8 °F (38.2 °C)] 100.8 °F (38.2 °C)  Pulse:  [124-128] 124  Resp:  [20-36] 36  SpO2:  [92 %-100 %] 100 %  BP: (113)/(59) 113/59     Weight: 64.4 kg (142 lb)  Body mass index is 20.97 kg/m².    SpO2: 100 %  O2 Device (Oxygen Therapy): BiPAP    No intake or output  data in the 24 hours ending 03/27/19 2356    Lines/Drains/Airways     Peripheral Intravenous Line                 Peripheral IV - Single Lumen 03/27/19 2055 Left Wrist less than 1 day         Peripheral IV - Single Lumen 03/27/19 2105 Right Wrist less than 1 day                Physical Exam   Constitutional: He appears well-developed.   HENT:   Head: Normocephalic and atraumatic.   Eyes: Conjunctivae and EOM are normal.   Neck: Normal range of motion. Neck supple. No JVD present.   Cardiovascular: Normal rate, regular rhythm and normal heart sounds.   No murmur heard.  Pulmonary/Chest: Effort normal and breath sounds normal. No respiratory distress. He has no wheezes. He has no rales.   Abdominal: Soft. Bowel sounds are normal. He exhibits no distension.   Musculoskeletal: Normal range of motion. He exhibits no edema.   Neurological: He is alert.   Skin: Skin is warm and dry. No erythema.   Psychiatric: He has a normal mood and affect. His behavior is normal. Judgment and thought content normal.   Nursing note and vitals reviewed.      Significant Labs: no prior labs    Significant Imaging: EKG: acute STEMI in the LAD distribution   Assessment and Plan:     Active Diagnoses:    Diagnosis Date Noted POA    Chest pain [R07.9] 03/27/2019 Yes      Problems Resolved During this Admission:     1. Acute STEMI  Acute STEMI in the anterior leads  - emergent coronary angiogram via radial access  - daughter POA signed consent.  - no prior hx of CVA, acute GIB    VTE Risk Mitigation (From admission, onward)    None          Abdiel Perez MD  Cardiology   [facility]

## 2019-03-28 NOTE — ED PROVIDER NOTES
"Encounter Date: 3/27/2019    SCRIBE #1 NOTE: I, Deb Lebron, am scribing for, and in the presence of,  Dr. Khan. I have scribed the entire note.       History     Chief Complaint   Patient presents with    Chest Pain     pt walked up to the nurses' station at [facility] c/o left chest pain and SOB. Per nurses pt was clammy and weak. Pain did not improve with 1 spray of nitro, 1" of nitro paste, and 324mg aspirin. Possible ST elevation noted on ekg by paramedics     Micky Ochoa is a 68 y.o. male who  has a past medical history of Anemia, COPD (chronic obstructive pulmonary disease), Dementia, GERD (gastroesophageal reflux disease), Migraine headache, Neuropathy, and Pancreatitis, chronic.    The patient presents to the ED due to chest pain. Per nursing home, the patient's symptoms began just prior to arrival in the ED. The patient alerted nursing home staff that he began having chest pain and shortness of breath. Nursing home staff reported to EMS the patient appeared pale, diaphoretic, and weak.   EMS gave the patient 1 spray of Nitro and 324 mg of aspirin en route with no improvement in pain.   Per EMS, the patient appeared to have ST elevation on EKG en route as well. The patient denies any history of CAD or MI. He denies any similar symptoms in the past.     The history is provided by the patient, the nursing home and the EMS personnel. The history is limited by the condition of the patient.     Review of patient's allergies indicates:   Allergen Reactions    Codeine      Past Medical History:   Diagnosis Date    Anemia     COPD (chronic obstructive pulmonary disease)     Dementia     GERD (gastroesophageal reflux disease)     Migraine headache     Neuropathy     Pancreatitis, chronic      Past Surgical History:   Procedure Laterality Date    TONSILLECTOMY       No family history on file.  Social History     Tobacco Use    Smoking status: Former Smoker   Substance Use Topics    Alcohol " use: Not Currently    Drug use: Not on file     Review of Systems   Constitutional: Negative for chills and fever.   HENT: Negative for congestion, ear pain, rhinorrhea and sore throat.    Respiratory: Positive for shortness of breath. Negative for cough and wheezing.    Cardiovascular: Positive for chest pain. Negative for palpitations.   Gastrointestinal: Negative for abdominal pain, diarrhea, nausea and vomiting.   Genitourinary: Negative for dysuria and hematuria.   Musculoskeletal: Negative for back pain, myalgias and neck pain.   Skin: Negative for rash.   Neurological: Negative for dizziness, weakness, light-headedness and headaches.   Psychiatric/Behavioral: Negative for confusion.       Physical Exam     Initial Vitals   BP Pulse Resp Temp SpO2   03/27/19 2051 03/27/19 2051 03/27/19 2051 03/27/19 2100 03/27/19 2051   (!) 113/59 (!) 128 20 (!) 100.8 °F (38.2 °C) (!) 92 %      MAP       --                Physical Exam    Nursing note and vitals reviewed.  Constitutional: He appears well-developed and well-nourished. He is not diaphoretic. No distress.   HENT:   Head: Normocephalic and atraumatic.   Mouth/Throat: Oropharynx is clear and moist.   Eyes: EOM are normal. Pupils are equal, round, and reactive to light.   Neck: No tracheal deviation present.   Cardiovascular: Regular rhythm, normal heart sounds and intact distal pulses. Tachycardia present.  Exam reveals no gallop and no friction rub.    No murmur heard.  Pulmonary/Chest: Breath sounds normal. No stridor. No respiratory distress. He has no wheezes. He has no rhonchi. He has no rales.   Abdominal: Soft. He exhibits no distension and no mass. There is no tenderness.   Musculoskeletal: Normal range of motion. He exhibits no edema or tenderness.   Neurological: He is alert and oriented to person, place, and time. No cranial nerve deficit or sensory deficit.   Skin: Skin is warm and dry. Capillary refill takes less than 2 seconds. No rash noted.    Psychiatric: He has a normal mood and affect. His behavior is normal. Thought content normal.         ED Course   Procedures  Labs Reviewed   CULTURE, BLOOD     EKG Readings: (Independently Interpreted)   Sinus tachycardia with a rate of 127. ST elevation in V2 and V3.  ST depression in V5 and V6. T wave inversion in AVL. Concerning for ischemia.        Imaging Results    None          Medical Decision Making:   Initial Assessment:   68 y.o male presents via EMS from [facility] with sudden onset of chest pain and shortness of breath.  EKG concerning for STEMI on arrival.  Code STEMI activated  Cardiology to evaluate at bedside.  Differential Diagnosis:   Differential Diagnosis includes, but is not limited to:  ACS/MI, PE, aortic dissection, pneumothorax, cardiac tamponade, pericarditis/myocarditis, pneumonia, infection/abscess, lung mass, trauma/fracture, costochondritis/pleurisy, MSK pain/contusion, GERD, biliary disease, pancreatitis, anemia    Independently Interpreted Test(s):   I have ordered and independently interpreted EKG Reading(s) - see prior notes  Clinical Tests:   Lab Tests: Ordered and Reviewed  Radiological Study: Ordered and Reviewed  Medical Tests: Ordered and Reviewed  ED Management:  8:59 PM   Case discussed with Dr. Perez, who agrees with activation of STEMI, will activate Cath Lab.    Patient also found to be febrile and tachycardic, IV ABX and IVF ordered to cover sepsis.    9:06 PM   Patient loaded of 180 mg of Brilinta.   Patient taken to Cath Lab.        Upon re-evaluation, the patient's status has remained critical.  At this time, I believe the patient should be admitted to the hospital for further evaluation and management of STEMI.  Cardiology service was contacted and the case was discussed.   The consulting physician/team agrees with plan and will admit under their service.   The patient and family were updated with test results, overall impression, and further plan of care.  All questions were answered. The patient expressed understanding and agrees with the current plan.                Attending Attestation:         Attending Critical Care:   Critical Care Times:   Direct Patient Care (initial evaluation, reassessments, and time considering the case)................................................................10 minutes.   Additional History from reviewing old medical records or taking additional history from the family, EMS, PCP, etc.......................10 minutes.   Ordering, Reviewing, and Interpreting Diagnostic Studies...............................................................................................................5 minutes.   Documentation..................................................................................................................................................................................5 minutes.   Consultation with other Physicians. .................................................................................................................................................10 minutes.   Consultation with the patient's family directly relating to the patient's condition, care, and DNR status (when patient unable)......5 minutes.   ==============================================================  · Total Critical Care Time - exclusive of procedural time: 45 minutes.  ==============================================================  Critical care was necessary to treat or prevent imminent or life-threatening deterioration of the following conditions: cardiac arrest.   Critical care was time spent personally by me on the following activities: obtaining history from patient or relative, examination of patient, review of x-rays / CT sent with the patient, review of old charts, ordering lab, x-rays, and/or EKG, development of treatment plan with patient or relative, ordering and performing treatments and interventions, evaluation of patient's  response to treatment, discussion with consultants, interpretation of cardiac measurements and re-evaluation of patient's conition.   Critical Care Condition: life-threatening         Medications   sodium chloride 0.9% bolus 1,932 mL (has no administration in time range)   vancomycin in dextrose 5 % 1 gram/250 mL IVPB 1,000 mg (has no administration in time range)   sodium chloride 0.9% bolus 500 mL (500 mLs Intravenous New Bag 3/27/19 2112)   ticagrelor tablet 180 mg (180 mg Oral Given 3/27/19 2112)   piperacillin-tazobactam 4.5 g in dextrose 5 % 100 mL IVPB (ready to mix system) (4.5 g Intravenous New Bag 3/28/19 0132)   potassium chloride 10 mEq in 100 mL IVPB (10 mEq Intravenous New Bag 3/28/19 0643)   furosemide injection 80 mg (80 mg Intravenous Given 3/28/19 0946)   potassium chloride 10 mEq in 100 mL IVPB (10 mEq Intravenous New Bag 3/28/19 1315)   furosemide injection 80 mg (80 mg Intravenous Given 3/28/19 2117)   potassium chloride 10 mEq in 100 mL IVPB (10 mEq Intravenous New Bag 3/29/19 1227)   furosemide injection 40 mg (40 mg Intravenous Given 3/30/19 1144)   potassium chloride SA CR tablet 60 mEq (60 mEq Oral Given 3/30/19 1144)   furosemide injection 40 mg (40 mg Intravenous Given 3/30/19 1919)   morphine injection 2 mg (2 mg Intravenous Given 3/30/19 2039)                  Clinical Impression:     1. Pleural effusion    2. Chest pain    3. ST elevation myocardial infarction (STEMI), unspecified artery    4. Delirious    5. Chest pain, unspecified type    6. Acute combined systolic and diastolic heart failure    7. Acute respiratory failure with hypoxia    8. Coronary artery disease involving native coronary artery of native heart with unstable angina pectoris    9. Hypokalemia    10. Leukocytosis, unspecified type    11. STEMI (ST elevation myocardial infarction)    12. ST elevation myocardial infarction involving left anterior descending (LAD) coronary artery    13. Yumiko GROSS Dr.  Edilson Khan, personally performed the services described in this documentation. All medical record entries made by the scribe were at my direction and in my presence.  I have reviewed the chart and agree that the record reflects my personal performance and is accurate and complete.     Edilson Khan MD.               Edilson Khan MD  04/02/19 9445

## 2019-03-28 NOTE — ASSESSMENT & PLAN NOTE
-tachypnea with desaturation post procedure  -LVEDP 30mmHG; CXR with evidence of pulmonary vascular congestion/interstitial edema bilaterally.  -given IV Lasix with only 500cc out overnight; continued tachypnea noted on exam; continue continuous BiPap and will repeat IV Lasix today  -other potential concern could be PNA given residence in NH, temp 100.4 and elevated WBCs-will continue IV Vanc and Zosyn for now

## 2019-03-28 NOTE — PROGRESS NOTES
"Vancomycin Dosing and Monitoring Pharmacy Protocol    Micky Ochoa is a 68 y.o. male    Height: 5' 9" (1.753 m)   Wt Readings from Last 3 Encounters:   03/27/19 64.4 kg (142 lb)       Temp Readings from Last 3 Encounters:   03/28/19 (!) 100.4 °F (38 °C)      Lab Results   Component Value Date/Time    WBC 19.69 (H) 03/28/2019 09:47 AM    WBC 14.83 (H) 03/27/2019 09:06 PM      Lab Results   Component Value Date/Time    CREATININE 1.0 03/28/2019 01:49 AM    CREATININE 1.1 03/27/2019 09:06 PM      Lab Results   Component Value Date/Time    LACTATE 1.8 03/28/2019 01:49 AM    LACTATE 1.9 03/27/2019 09:03 PM       Serum creatinine: 1 mg/dL 03/28/19 0149  Estimated creatinine clearance: 63.5 mL/min    Antibiotics (From admission, onward)      Start     Stop Route Frequency Ordered    03/29/19 1400  vancomycin (VANCOCIN) 1,750 mg in dextrose 5 % 500 mL IVPB      -- IV Every 24 hours (non-standard times) 03/28/19 1308    03/28/19 1400  piperacillin-tazobactam 4.5 g in dextrose 5 % 100 mL IVPB (ready to mix system)      -- IV Every 8 hours (non-standard times) 03/28/19 1240    03/28/19 1400  vancomycin (VANCOCIN) 1,750 mg in dextrose 5 % 500 mL IVPB  (Vancomycin IVPB with levels panel)      -- IV Once 03/28/19 1304    03/27/19 2130  vancomycin in dextrose 5 % 1 gram/250 mL IVPB 1,000 mg  (ED Adult Sepsis Treatment)      03/28 0929 IV ED 1 Time 03/27/19 2124          Antifungals (From admission, onward)      None            Microbiology Results (last 7 days)       Procedure Component Value Units Date/Time    Blood culture [[id number]] Collected:  03/27/19 2103    Order Status:  No result Specimen:  Blood Updated:  03/28/19 0524    Blood culture x two cultures. Draw prior to antibiotics. [[id number]] Collected:  03/28/19 0031    Order Status:  Sent Specimen:  Blood Updated:  03/28/19 0524    Influenza A & B by Molecular [[id number]] Collected:  03/27/19 2130    Order Status:  Completed Specimen:  Nasopharyngeal Swab Updated:  " 19     Influenza A, Molecular Negative     Influenza B, Molecular Negative     Flu A & B Source Nasal swab    Blood culture x two cultures. Draw prior to antibiotics. [[id number]] Collected:  19    Order Status:  Sent Specimen:  Blood from Peripheral, Wrist, Right Updated:  19            Indication/Target trough:   Pneumonia (Target trough: 15-20mcg/ml)    Hemodialysis:   N/A    Dosing Weight:   Wt Readings from Last 1 Encounters:   19 64.4 kg (142 lb)       Last Vancomycin dose: N/A   Date/Time given: N/A         Vancomycin level:  No results for input(s): VANCOMYCIN-TROUGH in the last 72 hours.  No results for input(s): VANCOMYCIN, RANDOM in the last 72 hours.    Per Protocol Initial/Adjustments Dosin. Initial/Adjustment Dose: Loading dose = 1750 mg x1, followed by Maintenance dose of 1750  mg q 24hr to be given at 3/29/19 at 1400 date/time  2. Vancomycin Trough Level will be drawn on 3/30/19 at 1330 date/time     Pharmacy will continue to follow.    Please contact if you have any further questions. Thank you.    Kamryn Bailey, PharmD  [phone]

## 2019-03-28 NOTE — PLAN OF CARE
Problem: Adult Inpatient Plan of Care  Goal: Plan of Care Review  Outcome: Ongoing (interventions implemented as appropriate)  Bipap on sb. Weaned to 50% venti mask. Will continue to monitor.

## 2019-03-28 NOTE — SUBJECTIVE & OBJECTIVE
Review of Systems   Unable to perform ROS: other     Objective:     Vital Signs (Most Recent):  Temp: (!) 100.4 °F (38 °C) (03/28/19 1145)  Pulse: (!) 120 (03/28/19 1146)  Resp: (!) 37 (03/28/19 1146)  BP: (!) 103/58 (03/28/19 0930)  SpO2: 100 % (03/28/19 1146) Vital Signs (24h Range):  Temp:  [97.8 °F (36.6 °C)-100.8 °F (38.2 °C)] 100.4 °F (38 °C)  Pulse:  [102-130] 120  Resp:  [20-41] 37  SpO2:  [75 %-100 %] 100 %  BP: ()/(58-79) 103/58     Weight: 64.4 kg (142 lb)  Body mass index is 20.97 kg/m².     SpO2: 100 %  O2 Device (Oxygen Therapy): BiPAP      Intake/Output Summary (Last 24 hours) at 3/28/2019 1205  Last data filed at 3/28/2019 0643  Gross per 24 hour   Intake 300 ml   Output 500 ml   Net -200 ml       Lines/Drains/Airways     Drain            Male External Urinary Catheter 03/27/19 2330 Small less than 1 day          Peripheral Intravenous Line                 Peripheral IV - Single Lumen 03/27/19 2055 Left Wrist less than 1 day                Physical Exam   Constitutional:  Non-toxic appearance. He has a sickly appearance. He appears ill. He appears distressed (mild tachypnea with accessory muscle use ).   Cardiovascular: Regular rhythm. Tachycardia present. Exam reveals no gallop.   No murmur heard.  Pulmonary/Chest: Effort normal. Tachypnea noted. No respiratory distress. He has decreased breath sounds. He has no wheezes.   Abdominal: Soft. Bowel sounds are normal. He exhibits no distension. There is no tenderness.   Neurological: He is alert.   arousable to verbal stimuli; answers questions but difficult to understand given BiPap; appears oriented    Skin: Skin is warm and dry.       Significant Labs:     Recent Labs   Lab 03/27/19  2106 03/28/19  0149    137   K 3.8 3.3*    102   CO2 24 22*   BUN 19 18   CREATININE 1.1 1.0   MG 2.0  --      Recent Labs   Lab 03/28/19  0947   WBC 19.69*   RBC 3.32*   HGB 10.8*   HCT 34.1*      *   MCH 32.5*   MCHC 31.7*     Recent  Labs   Lab 03/27/19  2221   TROPONINI 15.439*       Significant Imaging:     TTE 3/28/2019     Results pending

## 2019-03-28 NOTE — PROGRESS NOTES
Pharmacist Renal Dose Adjustment Note    Micky Ochoa is a 68 y.o. male being treated with the medication levofloxacin    Patient Data:    Vital Signs (Most Recent):  Temp: (!) 100.4 °F (38 °C) (03/28/19 1223)  Pulse: (!) 120 (03/28/19 1146)  Resp: (!) 37 (03/28/19 1146)  BP: (!) 103/58 (03/28/19 0930)  SpO2: 100 % (03/28/19 1146)   Vital Signs (72h Range):  Temp:  [97.8 °F (36.6 °C)-100.8 °F (38.2 °C)]   Pulse:  [102-130]   Resp:  [20-41]   BP: ()/(58-79)   SpO2:  [75 %-100 %]      Recent Labs   Lab 03/27/19 2106 03/28/19  0149   CREATININE 1.1 1.0     Serum creatinine: 1 mg/dL 03/28/19 0149  Estimated creatinine clearance: 63.5 mL/min    Medication:levofloxacin dose: 500mg frequency q24 will be changed to medication:levofloxacin dose:750mg frequency:q24    Pharmacist's Name: Elizabeth Anne  Pharmacist's Extension: [phone]

## 2019-03-28 NOTE — ASSESSMENT & PLAN NOTE
-WBCs 14K upon admission with trend up to 19K this AM; Tmax 100.4  -blood culture pending  -given IV Vanc and Zosyn post procedure and will continue for now; await culture results and de escalate abx when possible

## 2019-03-28 NOTE — CONSULTS
[facility] Care Medicine: Initial Consultation      HPI:     67 y/o male with a 120 pack year history of tobacco + COPD (not on oxygen at baseline but on LAMA-LABA, no PFTs on file), Alcohol Abuse, and Severe Dementia (lives in a NH, capable of ADLs) who presented with Chest Pain, in the setting of a troponin of 16 and EKG changes significant for ST elevations in the anterior lead with a BNP-2283-->subsequently underwent PCI with CHUN to the LAD.  Repeat ECHO with EF-25%.  [facility] consulted for concern for a concomitant infectious process/pneumonia with persistent hypoxic respiratory failure.    History obtained from the daughter (CASSY) via telephone (Concha Waggoner).  Patient has been living at the nursing home for the last two months, previously living with another sister.  At baseline, he is capable of going to the bathroom by himself and changing his clothes.  No prior cardiac history but has severe dementia.  His psych medications were recently changed and she is not at home right now (out of state in [facility]) to read off medications.  He apparently developed chest pain the date of admission and was taken emergency to [facility].  A Code STEMI was called and he underwent CHUN to the LAD-->transferred to the ICU on BiPAP.  He has had fevers up to 100.8 during hospitalization (day of admission) and 100.4 today.Diuresed and started on broad spectrum antibitotics.  Influenza negative.    Past Medical History:   Diagnosis Date    Anemia     COPD (chronic obstructive pulmonary disease)     Dementia     GERD (gastroesophageal reflux disease)     Migraine headache     Neuropathy     Pancreatitis, chronic      Past Surgical History:   Procedure Laterality Date    TONSILLECTOMY       Social History:   Social History     Socioeconomic History    Marital status: Single     Spouse name: Not on file    Number of children: Not on file    Years of education: Not on file    Highest education level: Not  on file   Occupational History    Not on file   Social Needs    Financial resource strain: Not on file    Food insecurity:     Worry: Not on file     Inability: Not on file    Transportation needs:     Medical: Not on file     Non-medical: Not on file   Tobacco Use    Smoking status: Former Smoker   Substance and Sexual Activity    Alcohol use: Not Currently    Drug use: Not on file    Sexual activity: Not on file   Lifestyle    Physical activity:     Days per week: Not on file     Minutes per session: Not on file    Stress: Not on file   Relationships    Social connections:     Talks on phone: Not on file     Gets together: Not on file     Attends Hindu service: Not on file     Active member of club or organization: Not on file     Attends meetings of clubs or organizations: Not on file     Relationship status: Not on file    Intimate partner violence:     Fear of current or ex partner: Not on file     Emotionally abused: Not on file     Physically abused: Not on file     Forced sexual activity: Not on file   Other Topics Concern    Not on file   Social History Narrative    Not on file     History reviewed. No pertinent family history.  Drug Allergies:   Review of patient's allergies indicates:   Allergen Reactions    Codeine      Current Infusions:    Scheduled Medications:     aspirin  81 mg Oral Daily    atorvastatin  40 mg Oral Daily    levoFLOXacin  750 mg Intravenous Q24H    metoprolol succinate  12.5 mg Oral Daily    piperacillin-tazobactam (ZOSYN) IVPB  4.5 g Intravenous Q8H    ticagrelor  90 mg Oral BID    vancomycin (VANCOCIN) IVPB  1,750 mg Intravenous Once    [START ON 3/29/2019] vancomycin (VANCOCIN) IVPB  1,750 mg Intravenous Q24H     PRN Medications:   acetaminophen, haloperidol lactate, hydrALAZINE, ondansetron, ramelteon    Review of Systems:   A comprehensive 12-point review of systems was performed, and is negative except for those items mentioned above in the HPI  section of this note.     Vital Signs:    Vitals:    03/28/19 1545   BP: (!) 97/55   Pulse: 98   Resp: (!) 30   Temp:        Fluid Balance:     Intake/Output Summary (Last 24 hours) at 3/28/2019 1628  Last data filed at 3/28/2019 0643  Gross per 24 hour   Intake 300 ml   Output 500 ml   Net -200 ml       Physical Exam:   General: NAD, cooperative & interactive.  HEENT: AT/NC, PERRL, EOMI, nasal and oral mucosa moist. Normal dentition. Oropharynx without exudate.   Neck: Supple without JVD. Trachea midline. Thyroid feels normal.   Cardiac: RRR with no MRG with brisk cap refill and symmetric pulses in distal extremities.  Respiratory: Normal inspection. Symmetric chest rise. Normal palpation and percussion. Auscultation clear bilaterally. No increased work of breathing noted.   Abdomen: Soft, NT/ND. +BS. No palpable masses. No hepatosplenomegaly.   Lymphatic: No palpable supraclavicular or cervical LAD.   Extremities: Normal strength and tone (grossly). No visible atrophy. No clubbing or cyanosis on nail exam.   Skin: Warm and dry without visible rash.   Neuro: Grossly intact to brief exam. Oriented with appropriate mood & affect.     Personal Review and Summary of Prior Diagnostics    Laboratory Studies:   Recent Labs   Lab 03/28/19  1111   PH 7.511*   PCO2 30.3*   PO2 116*   HCO3 24.2   POCSATURATED 99   BE 1     Recent Labs   Lab 03/28/19  0947   WBC 19.69*   RBC 3.32*   HGB 10.8*   HCT 34.1*      *   MCH 32.5*   MCHC 31.7*     Recent Labs   Lab 03/27/19  2106 03/28/19  0149    137   K 3.8 3.3*    102   CO2 24 22*   BUN 19 18   CREATININE 1.1 1.0   MG 2.0  --        Microbiology Data:   Microbiology Results (last 7 days)     Procedure Component Value Units Date/Time    Blood culture x two cultures. Draw prior to antibiotics. [[id number]] Collected:  03/28/19 0031    Order Status:  Completed Specimen:  Blood Updated:  03/28/19 1345     Blood Culture, Routine No Growth to date    Narrative:        Aerobic and anaerobic    Blood culture [[id number]] Collected:  03/27/19 2103    Order Status:  Completed Specimen:  Blood Updated:  03/28/19 1345     Blood Culture, Routine No Growth to date    Influenza A & B by Molecular [[id number]] Collected:  03/27/19 2130    Order Status:  Completed Specimen:  Nasopharyngeal Swab Updated:  03/27/19 2155     Influenza A, Molecular Negative     Influenza B, Molecular Negative     Flu A & B Source Nasal swab    Blood culture x two cultures. Draw prior to antibiotics. [[id number]] Collected:  03/27/19 2105    Order Status:  Sent Specimen:  Blood from Peripheral, Wrist, Right Updated:  03/27/19 2130        Chest Imaging: multifocal diffuse opacities      Assessment/Plan    67 y/o male with a 120 pack year history of tobacco + COPD (not on oxygen at baseline but on LAMA-LABA, no PFTs on file), Alcohol Abuse, and Severe Dementia (lives in a NH, capable of ADLs) who presented with Chest Pain, in the setting of a troponin of 16 and EKG changes significant for ST elevations in the anterior leads with a BNP-2283    -->subsequently underwent PCI with CHUN to the LAD (now on DAPT).  Repeat ECHO with EF-25%.  [facility] consulted for concern for a concomitant infectious process/pneumonia with persistent hypoxic respiratory failure.    *History obtained from the daughter (CASSY) via telephone (Concha Waggoner).    Acute Hypoxic Respiratory Failure 2/2 Cardiogenic Pulmonary Edema +/- Aspiration Pneumonitis    -Initial chest x-ray on admission with multifocal opacities.  Fevers as high as 100.8 (day of admission)-->down to 100.4 today.  Influenza negative.  BNP-2283. ECHO with EF-25%    -Currently off/on BiPAP-->on my exam on room air.  ABG this morning 7.5/30/116/99    Recommendations:    -Reasonable to cover broadly for the first 24 hrs and de-escalate based on cultures.  Agree with Vanc/Zosyn/Levofloxacin.  Influenza negative.  Would send sputum and blood cultures as well as urine  legionella and urine streptococcal antigens.      -Agree with aggressive diuresis as well.  Currently -200/since admission.  Received IV lasix 80 mg earlier.  Will order for an additional dose now.    -Repeat chest x-ray in morning.  If bilateral airspace opacities improve this would argue against a concomitant pneumonia and favor pulmonary edema with aspiration pneumonitis.      -VTE also on the differential but less likely with above culprits.    -Will try trials off BiPAP during the day and only place back on for work of breathing (will also help with preload and afterload reduction)    -Agree with SLP evaluation    -Added ICS/LABA (BREO) and LAMA (Spiriva ) + breathing aerosolized treatments.    **Per daughter, the patient has severe dementia and recently had his psych medications changed.  She was unable to tell me which medications he is on as she is out of town and doesn't have the list but this is most likely contributing to his agitation.    Armando Petersen MD, MSc  Pulmonary/Critical Care Fellow

## 2019-03-28 NOTE — ASSESSMENT & PLAN NOTE
-presented with STEMI and elevated troponin  -emergent LHC with LAD CHUN x 3  -continue ASA, statin, BB and Brilinta; no ACEI/ARB due to marginal BP  -echocardiogram today for evaluation of LVEF and valve structure

## 2019-03-28 NOTE — NURSING
"Pt has been agitated and anxious. Haldol given for agitation. Pt removed bipap while in restraints. Pt says he "can't breathe with this thing." Bipap removed per refusal. Pt is now on 2L nc sat at 100%. Started 1st dose of zosyn. Pt fever down from 100.4 to 99.3. Continuing to monitor urine output and breath sounds.   "

## 2019-03-28 NOTE — PROGRESS NOTES
[facility]  Cardiology  Progress Note    Patient Name: Micky Ochoa  MRN: [id number]  Admission Date: 3/27/2019  Hospital Length of Stay: 1 days  Code Status: No Order   Attending Physician: Abdiel Perez MD   Primary Care Physician: Higinio Brush MD  Expected Discharge Date:   Principal Problem:Chest pain    Subjective:     Hospital Course:   3/27/2019 Presented to the ER with complaints of chest pain. Initial troponin 16.027 with trend down to 15.439. EKG with JERMAINE to anterior leads. BMP WNL. CBC with WBCs 14K and temp 100.4- cultures ordered. Given IV Vanc and Zosyn. Taken to cath lab for emergent procedure  3/28/2019 Emergent LHC yesterday for JERMAINE with occluded LAD treated with LAD CHUN x 3 and elevated LVEDP. Acute distress with hypoxia and tahcypnea noted post procedure. Given IV Lasix and placed on BiPap admitted to ICU. HR elevated in the 120s ST since admission with stable BP. Tachypnea persists with stable sats on BiPap. Repeat IV Lasix ordered. ABG done with pH 7.51 pCO2 30.3 pO2 116 HCO3 24.3 BiPap adjusted from 12/6 to 15/6. BMP with K+ 3.3-replacement ordered. Creatinine 1.0. CBC with WBC 19K-blood cultures pending. Continue GDMT with IV Lasix and BiPap for volume removal. Echocardiogram today with pending results         Review of Systems   Unable to perform ROS: other     Objective:     Vital Signs (Most Recent):  Temp: (!) 100.4 °F (38 °C) (03/28/19 1145)  Pulse: (!) 120 (03/28/19 1146)  Resp: (!) 37 (03/28/19 1146)  BP: (!) 103/58 (03/28/19 0930)  SpO2: 100 % (03/28/19 1146) Vital Signs (24h Range):  Temp:  [97.8 °F (36.6 °C)-100.8 °F (38.2 °C)] 100.4 °F (38 °C)  Pulse:  [102-130] 120  Resp:  [20-41] 37  SpO2:  [75 %-100 %] 100 %  BP: ()/(58-79) 103/58     Weight: 64.4 kg (142 lb)  Body mass index is 20.97 kg/m².     SpO2: 100 %  O2 Device (Oxygen Therapy): BiPAP      Intake/Output Summary (Last 24 hours) at 3/28/2019 1205  Last data filed at 3/28/2019 0643  Gross per 24  hour   Intake 300 ml   Output 500 ml   Net -200 ml       Lines/Drains/Airways     Drain            Male External Urinary Catheter 03/27/19 2330 Small less than 1 day          Peripheral Intravenous Line                 Peripheral IV - Single Lumen 03/27/19 2055 Left Wrist less than 1 day                Physical Exam   Constitutional:  Non-toxic appearance. He has a sickly appearance. He appears ill. He appears distressed (mild tachypnea with accessory muscle use ).   Cardiovascular: Regular rhythm. Tachycardia present. Exam reveals no gallop.   No murmur heard.  Pulmonary/Chest: Effort normal. Tachypnea noted. No respiratory distress. He has decreased breath sounds. He has no wheezes.   Abdominal: Soft. Bowel sounds are normal. He exhibits no distension. There is no tenderness.   Neurological: He is alert.   arousable to verbal stimuli; answers questions but difficult to understand given BiPap; appears oriented    Skin: Skin is warm and dry.       Significant Labs:     Recent Labs   Lab 03/27/19  2106 03/28/19  0149    137   K 3.8 3.3*    102   CO2 24 22*   BUN 19 18   CREATININE 1.1 1.0   MG 2.0  --      Recent Labs   Lab 03/28/19  0947   WBC 19.69*   RBC 3.32*   HGB 10.8*   HCT 34.1*      *   MCH 32.5*   MCHC 31.7*     Recent Labs   Lab 03/27/19  2221   TROPONINI 15.439*       Significant Imaging:     TTE 3/28/2019     Results pending     Assessment and Plan:     Brief HPI: Seen this morning on AM NP rounds while resting in bed with BiPap in place. Denies any complaints. No family at bedside to update on POC-will update when available     * Chest pain  -relatedt o ischemic etiology and JERMAINE  -s/p LAD  -denies any chest pain this AM     Leukocytosis  -WBCs 14K upon admission with trend up to 19K this AM; Tmax 100.4  -blood culture pending  -given IV Vanc and Zosyn post procedure and will continue for now; await culture results and de escalate abx when possible     Hypokalemia  -K+ 3.8 upon  admission; down to 3.3 this AM  -related to NPO status and IV Lasix use  -replacement with IV K+ for goal of 4.0  -recheck K+ and Mg in AM    Acute respiratory failure  -tachypnea with desaturation post procedure  -LVEDP 30mmHG; CXR with evidence of pulmonary vascular congestion/interstitial edema bilaterally.  -given IV Lasix with only 500cc out overnight; continued tachypnea noted on exam; continue continuous BiPap and will repeat IV Lasix today  -other potential concern could be PNA given residence in NH, temp 100.4 and elevated WBCs-will continue IV Vanc and Zosyn for now     Tachycardia  -HR 110s-120s on telemetry; ST; no evidence of SVT, afib/aflutter or VT  -continue BB therapy for now; will not uptitrate due to concern for ADHF and marginal BP  -likely stress related    STEMI (ST elevation myocardial infarction)  -presented with chest pain and JERMAINE to anterior leads  -emergent LHC with LAD CHUN x 3; repeat EKG with continued JERMAINE to anterior leads with slight improvement; currently chest pain free  -will continue to monitor EKGs   -continue GDMT with DAPT, BB and statin therapy  -echocardiogram today     Coronary artery disease involving native coronary artery  -presented with STEMI and elevated troponin  -emergent LHC with LAD CHUN x 3  -continue ASA, statin, BB and Brilinta; no ACEI/ARB due to marginal BP  -echocardiogram today for evaluation of LVEF and valve structure         VTE Risk Mitigation (From admission, onward)    None          HUSAM West, ANP  Cardiology  [facility]

## 2019-03-28 NOTE — ASSESSMENT & PLAN NOTE
-HR 110s-120s on telemetry; ST; no evidence of SVT, afib/aflutter or VT  -continue BB therapy for now; will not uptitrate due to concern for ADHF and marginal BP  -likely stress related

## 2019-03-28 NOTE — HOSPITAL COURSE
3/27/2019 Presented to the ER with complaints of chest pain. Initial troponin 16.027 with trend down to 15.439. EKG with JERMAINE to anterior leads. BMP WNL. CBC with WBCs 14K and temp 100.4- cultures ordered. Given IV Vanc and Zosyn. Taken to cath lab for emergent procedure  3/28/2019 Emergent LHC yesterday for JERMAINE with occluded LAD treated with LAD CHUN x 3 and elevated LVEDP. Acute distress with hypoxia and tahcypnea noted post procedure. Given IV Lasix and placed on BiPap admitted to ICU. HR elevated in the 120s ST since admission with stable BP. Tachypnea persists with stable sats on BiPap. Repeat IV Lasix ordered. ABG done with pH 7.51 pCO2 30.3 pO2 116 HCO3 24.3 BiPap adjusted from 12/6 to 15/6. BMP with K+ 3.3-replacement ordered. Creatinine 1.0. CBC with WBC 19K-blood cultures pending. Continue GDMT with IV Lasix and BiPap for volume removal. Echocardiogram today with pending results   3/29/2019 Agitated overnight with initiation of Precedex per eICU. Psych consulted with recommendations noted. Off Bipap with improvement in tachypnea noted today. 2.1 liters out overnight and negative 1.6 liters since admission. HR trended down to 90s from 110s-120s. Marginal BP yesterday with trend down to 80s-90s overnight with MAPs [id number] discontinue Toprol XL for now. K+ 2.9 with IV replacement ordered-NPO until seen by speech. Blood cultures with NGTD. Sputum culture pending. Remains on IV abx and appreciate Pulmonary recs  3/30/2019 No acute issues last night. SBP marginal. Remains off ACEI and BB  3/31/2019Seen this AM. Complained of mild chest pain and dyspnea. Pain improved with SL NTG controlled the pain and IV lasix given for dyspnea. Appears better currently. PT and OT on board and at baseline functional status per notes. Hopeful to discharge back to [facility]  4/1/2019 Resting comfortably this AM. HR stable. SBP 110s-130s with BP down to 88/51 this AM. Blood cultures with NGTD. Sputum culture not  collected due to no sputum production. Pulmonary note reviewed from 3/30 with low suspicion of PNA and recommendation for de escalation of antibiotics. Will give dose of oral Lasix today. Apprehensive to discharge home on Lasix due to minimal po intake and baseline dementia. Continue medical management for CAD and CHF although limited due to marginal BPs. Plan to discharge to [facility] today. Medically stable for discharge back to [facility] however they require patient to be transferred out of ICU to floor prior to accepting patient back to facility. HR and BP stable. Transfer orders written   4/2/2019 Stable overnight with no acute issues. HR and BP stable. Placed on oral Lasix yesterday with 1.5 liters out overnight and no documented intake. K+ 3.1 Mg 2.2 Creatinine 1.3-will replace K+ with oral potassium. Started on low dose BB as well. Awaiting transfer to the floor in order to be able to be transitioned back to [facility]-medically stable and awaiting transition back to NH

## 2019-03-28 NOTE — PLAN OF CARE
PRN meds given to keep the patient calmed down, patient is continuously puling the BIPAP off even after the soft bilateral wrist restraints. Pulled BIPAP several times, Visual contact maintained continuously. Patient is constantly talking through the BIPAP. Pulled the condom cath out. Redirection provided several times but patient is asking to take the BIPAP off. Secured mittens applied for safety concerns.

## 2019-03-28 NOTE — BRIEF OP NOTE
[facility]  Brief Operative Note  [facility]    SUMMARY     Surgery Date: 3/27/2019     Surgeon(s) and Role:     * Abdiel Perez MD - Primary    Assisting Surgeon: None    Pre-op Diagnosis:  ST elevation myocardial infarction (STEMI), unspecified artery [I21.3]    Post-op Diagnosis: Post-Op Diagnosis Codes:     * ST elevation myocardial infarction (STEMI), unspecified artery [I21.3]    Procedure Performed: CORONARY STENT    Procedure(s) (LRB):  CATHETERIZATION, HEART, LEFT (Left)  Stent, Drug Eluting, Single Vessel, Coronary  Percutaneous coronary intervention (N/A)  ANGIOGRAM, CORONARY ARTERY (N/A)  Ultrasound-coronary (N/A)    Findings of the procedure:   - Left Main: Patent with JOON 3 flow  - Left Anterior Descending Artery: acute on chronic obstructive CAD of the proximal LAD. 90-95% stenosis. Occluded D1 and D2 - small caliber vessels full of thrombosis..   - heavily calcified prox-mid LAD- positive remodeling    - Left Circumflex Artery: Non-Dominant vessel. Patent with JOON 3 flow. Luminal irregularities. Patent Obtuse Marginal branches with JOON 3 flow  - Right Coronary Artery: Dominant vessel. Patent with JOON 3 flow luminal irregularities of the mid-RCA    LVEDP: 28 mmHG    Estimated Blood Loss: * No values recorded between 3/27/2019  9:40 PM and 3/27/2019 10:32 PM *    Plan:    Successful PCI of the proximal LAD with 3.25 x 33 CHUN  Post-dilated with a 2.5 NC balloon    ICU overnight  BPAP as need for hypoxia  - PRN medication for agitation  - -asa/brilinta/statin  - echo in am  - will wean of levophed. (map fell below 50 in cath lab)         Thank you for the opportunity to care for this patient. Will be available for questions if needed.     Abdiel Perez MD  Interventional Cardiology  [facility]  Pager: [phone]

## 2019-03-28 NOTE — PROGRESS NOTES
"[facility]  Adult Nutrition  Progress Note    SUMMARY       Recommendations    Recommendation   1. Once patient able to advance po, rec regular diet with SLP rec for consistency and texture.    2. If pt unable to adv to oral diet, rec TF via NGT of Isosource 1.5 @ 10 mL/hr to increase as FILOMENA by 10 mL/hr to goal rate of 50 mL/hr to provide 1800 calories, 81 gm Pro, 1026 mL water    3. Obtain accurate bedscale weight. Patient has stated weight on admit.     Goals: Pt to start some form of nutrition within 48-72 hrs  Nutrition Goal Status: new  Communication of RD Recs: reviewed with RN    Nutrition Discharge Planning: pending     Reason for Assessment    Reason For Assessment: identified at risk by screening criteria  Diagnosis: cardiac disease(STEMI)    Relevant Medical History:   Past Medical History:   Diagnosis Date    Anemia     COPD (chronic obstructive pulmonary disease)     Dementia     GERD (gastroesophageal reflux disease)     Migraine headache     Neuropathy     Pancreatitis, chronic      Past Surgical History:   Procedure Laterality Date    TONSILLECTOMY         General Information Comments: NFPE deferred due to AMS and pt in restraints. Pt on bipap.    Nutrition Risk Screen    Nutrition Risk Screen: dysphagia or difficulty swallowing    Nutrition/Diet History    Patient Reported Diet/Restrictions/Preferences: soft  Typical Food/Fluid Intake: Per chart, pt lives in NH  Spiritual, Cultural Beliefs, Buddhism Practices, Values that Affect Care: no  Food Allergies: NKFA  Factors Affecting Nutritional Intake: impaired cognitive status/motor control, difficulty/impaired swallowing, NPO    Anthropometrics    Temp: (!) 100.4 °F (38 °C)  Height Method: Stated  Height: 5' 9" (175.3 cm)  Height (inches): 69 in  Weight Method: Stated  Weight: 64.4 kg (142 lb)  Weight (lb): 142 lb  Ideal Body Weight (IBW), Male: 160 lb  % Ideal Body Weight, Male (lb): 88.75 lb  BMI (Calculated): 21  BMI " Grade: 18.5-24.9 - normal       Lab/Procedures/Meds    Pertinent Labs Reviewed: reviewed  Recent Labs   Lab 03/27/19 2106 03/28/19  0149    137   K 3.8 3.3*    102   CO2 24 22*   BUN 19 18   CREATININE 1.1 1.0   MG 2.0  --      Recent Labs   Lab 03/28/19  0947   WBC 19.69*   RBC 3.32*   HGB 10.8*   HCT 34.1*      *   MCH 32.5*   MCHC 31.7*     Recent Labs   Lab 03/27/19  2106   ALT 18   AST 29   ALKPHOS 76   BILITOT 1.1*   PROT 7.4   ALBUMIN 3.2*       Pertinent Medications Reviewed: reviewed  Scheduled Meds:   aspirin  81 mg Oral Daily    atorvastatin  40 mg Oral Daily    metoprolol succinate  12.5 mg Oral Daily    piperacillin-tazobactam (ZOSYN) IVPB  4.5 g Intravenous Q8H    ticagrelor  90 mg Oral BID    vancomycin (VANCOCIN) IVPB  1,750 mg Intravenous Once    [START ON 3/29/2019] vancomycin (VANCOCIN) IVPB  1,750 mg Intravenous Q24H     Continuous Infusions:  PRN Meds:.acetaminophen, haloperidol lactate, hydrALAZINE, ondansetron, ramelteon      Estimated/Assessed Needs    Weight Used For Calorie Calculations: 64.4 kg (141 lb 15.6 oz)  Energy Calorie Requirements (kcal): 1818(1.3 PAL)  Energy Need Method: [place]- Zackor     Protein Requirements: 77(1.2 gm Pro/kg)  Weight Used For Protein Calculations: 64.4 kg (141 lb 15.6 oz)     Estimated Fluid Requirement Method: RDA Method  RDA Method (mL): 1818  CHO Requirement: 50%      Nutrition Prescription Ordered    Current Diet Order: NPO    Evaluation of Received Nutrient/Fluid Intake    I/O: reviewed  Comments: LBM 3/28 ?     % Intake of Estimated Energy Needs: 0 - 25 %  % Meal Intake: NPO    Nutrition Risk    Level of Risk/Frequency of Follow-up: (2x week)       Assessment and Plan    Acute respiratory failure  Nutrition Problem  Inadequate oral intake    Related to (etiology):   Recent STEMI with need for BI-pap  Previous Medical History of Dysphagia    Signs and Symptoms (as evidenced by):   NPO status      Interventions:  Collaboration with other providers     Nutrition Diagnosis Status:   New             Monitor and Evaluation    Food and Nutrient Intake: food and beverage intake  Food and Nutrient Adminstration: diet order  Anthropometric Measurements: weight, weight change, body mass index  Biochemical Data, Medical Tests and Procedures: electrolyte and renal panel, gastrointestinal profile, glucose/endocrine profile  Nutrition-Focused Physical Findings: overall appearance     Malnutrition Assessment  Malnutrition Type: acute illness or injury       Nutrition Follow-Up    RD Follow-up?: Yes

## 2019-03-28 NOTE — ASSESSMENT & PLAN NOTE
-K+ 3.8 upon admission; down to 3.3 this AM  -related to NPO status and IV Lasix use  -replacement with IV K+ for goal of 4.0  -recheck K+ and Mg in AM

## 2019-03-28 NOTE — PLAN OF CARE
Patient in the ICU. Patient in restraints and BIPAP in place. No family at bedside.     03/28/19 1417   Discharge Assessment   Assessment Type Discharge Planning Assessment   Confirmed/corrected address and phone number on facesheet? No   Assessment information obtained from? Medical Record   Communicated expected length of stay with patient/caregiver no   Prior to hospitilization cognitive status: Unable to Assess   Current cognitive status: Unable to Assess   Able to Return to Prior Arrangements other (see comments)   Patient currently being followed by outpatient case management? Unable to determine (comments)   Patient currently receives any other outside agency services? No   Do you have any problems affording any of your prescribed medications? TBD

## 2019-03-28 NOTE — PLAN OF CARE
Problem: Oral Intake Inadequate  Goal: Improved Oral Intake    Intervention: Promote and Optimize Oral Intake  Recommendation   1. Once patient able to advance po, rec regular diet with SLP rec for consistency and texture.    2. If pt unable to adv to oral diet, rec TF via NGT of Isosource 1.5 @ 10 mL/hr to increase as FILOMENA by 10 mL/hr to goal rate of 50 mL/hr to provide 1800 calories, 81 gm Pro, 1026 mL water    3. Obtain accurate bedscale weight. Patient has stated weight on admit.      Goals: Pt to start some form of nutrition within 48-72 hrs  Nutrition Goal Status: new  Communication of RD Recs: reviewed with RN     Nutrition Discharge Planning: pending

## 2019-03-28 NOTE — EICU
New admit    69 y/o M nursing home resident presented with chest pain, STEMI on EKG underwent LHC with successful PCI of proximal LAD.  Had transient hypotension in the cath lab as well as hypoxemia requiring BiPap.    Camera assessment:  Tolerating BiPap    Telemetry:  /67    O2 94%    Data:  BNP 2283, Trop I 30817  K 3.3, creatinine 1  Mg 2  WBC 15, H/H 10.5/33, plt 264    · STEMI s/p PCI to LAD on ASA, tigacrelor, be blocker  · Hypoxemic respiratory failure requiring BiPap, suspect pulmonary edema, awaiting CXR  · Hypokalemia will replace IV as unable to be off BiPap. Ordered 20 meqs IV likely would need more but preferably oral replacement to avoid further volume load

## 2019-03-29 PROBLEM — I50.41 ACUTE COMBINED SYSTOLIC AND DIASTOLIC HEART FAILURE: Status: ACTIVE | Noted: 2019-03-29

## 2019-03-29 LAB
ALBUMIN SERPL BCP-MCNC: 2.7 G/DL (ref 3.5–5.2)
ALP SERPL-CCNC: 82 U/L (ref 55–135)
ALT SERPL W/O P-5'-P-CCNC: 14 U/L (ref 10–44)
ANION GAP SERPL CALC-SCNC: 15 MMOL/L (ref 8–16)
AST SERPL-CCNC: 27 U/L (ref 10–40)
BILIRUB SERPL-MCNC: 1.8 MG/DL (ref 0.1–1)
BUN SERPL-MCNC: 17 MG/DL (ref 8–23)
CALCIUM SERPL-MCNC: 8.9 MG/DL (ref 8.7–10.5)
CHLORIDE SERPL-SCNC: 99 MMOL/L (ref 95–110)
CO2 SERPL-SCNC: 24 MMOL/L (ref 23–29)
CREAT SERPL-MCNC: 1.2 MG/DL (ref 0.5–1.4)
EST. GFR  (AFRICAN AMERICAN): >60 ML/MIN/1.73 M^2
EST. GFR  (NON AFRICAN AMERICAN): >60 ML/MIN/1.73 M^2
GLUCOSE SERPL-MCNC: 121 MG/DL (ref 70–110)
POTASSIUM SERPL-SCNC: 2.9 MMOL/L (ref 3.5–5.1)
PROCALCITONIN SERPL IA-MCNC: 0.63 NG/ML
PROT SERPL-MCNC: 7.9 G/DL (ref 6–8.4)
SODIUM SERPL-SCNC: 138 MMOL/L (ref 136–145)

## 2019-03-29 PROCEDURE — 92610 EVALUATE SWALLOWING FUNCTION: CPT

## 2019-03-29 PROCEDURE — 93010 ELECTROCARDIOGRAM REPORT: CPT | Mod: ,,, | Performed by: INTERNAL MEDICINE

## 2019-03-29 PROCEDURE — 94640 AIRWAY INHALATION TREATMENT: CPT

## 2019-03-29 PROCEDURE — 25000003 PHARM REV CODE 250: Performed by: PSYCHIATRY & NEUROLOGY

## 2019-03-29 PROCEDURE — 99900035 HC TECH TIME PER 15 MIN (STAT)

## 2019-03-29 PROCEDURE — 25000242 PHARM REV CODE 250 ALT 637 W/ HCPCS: Performed by: STUDENT IN AN ORGANIZED HEALTH CARE EDUCATION/TRAINING PROGRAM

## 2019-03-29 PROCEDURE — 25000003 PHARM REV CODE 250: Performed by: INTERNAL MEDICINE

## 2019-03-29 PROCEDURE — 97161 PT EVAL LOW COMPLEX 20 MIN: CPT

## 2019-03-29 PROCEDURE — 63600175 PHARM REV CODE 636 W HCPCS: Performed by: NURSE PRACTITIONER

## 2019-03-29 PROCEDURE — 99291 CRITICAL CARE FIRST HOUR: CPT | Mod: ,,, | Performed by: NURSE PRACTITIONER

## 2019-03-29 PROCEDURE — 20000000 HC ICU ROOM

## 2019-03-29 PROCEDURE — 36415 COLL VENOUS BLD VENIPUNCTURE: CPT

## 2019-03-29 PROCEDURE — 93005 ELECTROCARDIOGRAM TRACING: CPT

## 2019-03-29 PROCEDURE — 94761 N-INVAS EAR/PLS OXIMETRY MLT: CPT

## 2019-03-29 PROCEDURE — 99291 PR CRITICAL CARE, E/M 30-74 MINUTES: ICD-10-PCS | Mod: ,,, | Performed by: NURSE PRACTITIONER

## 2019-03-29 PROCEDURE — 25000003 PHARM REV CODE 250: Performed by: NURSE PRACTITIONER

## 2019-03-29 PROCEDURE — 93010 EKG 12-LEAD: ICD-10-PCS | Mod: ,,, | Performed by: INTERNAL MEDICINE

## 2019-03-29 PROCEDURE — 25000003 PHARM REV CODE 250: Performed by: STUDENT IN AN ORGANIZED HEALTH CARE EDUCATION/TRAINING PROGRAM

## 2019-03-29 PROCEDURE — 80053 COMPREHEN METABOLIC PANEL: CPT

## 2019-03-29 PROCEDURE — 63600175 PHARM REV CODE 636 W HCPCS: Performed by: STUDENT IN AN ORGANIZED HEALTH CARE EDUCATION/TRAINING PROGRAM

## 2019-03-29 PROCEDURE — 97165 OT EVAL LOW COMPLEX 30 MIN: CPT

## 2019-03-29 PROCEDURE — 94660 CPAP INITIATION&MGMT: CPT

## 2019-03-29 RX ORDER — QUETIAPINE FUMARATE 25 MG/1
25 TABLET, FILM COATED ORAL NIGHTLY
Status: DISCONTINUED | OUTPATIENT
Start: 2019-03-29 | End: 2019-04-02 | Stop reason: HOSPADM

## 2019-03-29 RX ORDER — POTASSIUM CHLORIDE 29.8 MG/ML
40 INJECTION INTRAVENOUS EVERY 4 HOURS
Status: DISCONTINUED | OUTPATIENT
Start: 2019-03-29 | End: 2019-03-29

## 2019-03-29 RX ORDER — QUETIAPINE FUMARATE 100 MG/1
100 TABLET, FILM COATED ORAL
Status: DISCONTINUED | OUTPATIENT
Start: 2019-03-29 | End: 2019-03-29

## 2019-03-29 RX ORDER — DEXMEDETOMIDINE HYDROCHLORIDE 4 UG/ML
0.2 INJECTION INTRAVENOUS CONTINUOUS
Status: DISCONTINUED | OUTPATIENT
Start: 2019-03-29 | End: 2019-04-02

## 2019-03-29 RX ORDER — POTASSIUM CHLORIDE 7.45 MG/ML
10 INJECTION INTRAVENOUS
Status: COMPLETED | OUTPATIENT
Start: 2019-03-29 | End: 2019-03-29

## 2019-03-29 RX ORDER — VANCOMYCIN HCL IN 5 % DEXTROSE 1G/250ML
1000 PLASTIC BAG, INJECTION (ML) INTRAVENOUS
Status: DISCONTINUED | OUTPATIENT
Start: 2019-03-29 | End: 2019-03-29

## 2019-03-29 RX ORDER — VANCOMYCIN HCL IN 5 % DEXTROSE 1G/250ML
1000 PLASTIC BAG, INJECTION (ML) INTRAVENOUS
Status: DISCONTINUED | OUTPATIENT
Start: 2019-03-29 | End: 2019-04-01

## 2019-03-29 RX ADMIN — PIPERACILLIN AND TAZOBACTAM 4.5 G: 4; .5 INJECTION, POWDER, LYOPHILIZED, FOR SOLUTION INTRAVENOUS; PARENTERAL at 10:03

## 2019-03-29 RX ADMIN — ASPIRIN 81 MG 81 MG: 81 TABLET ORAL at 08:03

## 2019-03-29 RX ADMIN — QUETIAPINE FUMARATE 12.5 MG: 25 TABLET, FILM COATED ORAL at 05:03

## 2019-03-29 RX ADMIN — TICAGRELOR 90 MG: 90 TABLET ORAL at 09:03

## 2019-03-29 RX ADMIN — PIPERACILLIN AND TAZOBACTAM 4.5 G: 4; .5 INJECTION, POWDER, LYOPHILIZED, FOR SOLUTION INTRAVENOUS; PARENTERAL at 02:03

## 2019-03-29 RX ADMIN — FLUTICASONE FUROATE AND VILANTEROL TRIFENATATE 1 PUFF: 100; 25 POWDER RESPIRATORY (INHALATION) at 07:03

## 2019-03-29 RX ADMIN — IPRATROPIUM BROMIDE AND ALBUTEROL SULFATE 3 ML: .5; 3 SOLUTION RESPIRATORY (INHALATION) at 07:03

## 2019-03-29 RX ADMIN — ALPRAZOLAM 0.5 MG: 0.25 TABLET ORAL at 08:03

## 2019-03-29 RX ADMIN — IPRATROPIUM BROMIDE AND ALBUTEROL SULFATE 3 ML: .5; 3 SOLUTION RESPIRATORY (INHALATION) at 12:03

## 2019-03-29 RX ADMIN — TICAGRELOR 90 MG: 90 TABLET ORAL at 08:03

## 2019-03-29 RX ADMIN — PIPERACILLIN AND TAZOBACTAM 4.5 G: 4; .5 INJECTION, POWDER, LYOPHILIZED, FOR SOLUTION INTRAVENOUS; PARENTERAL at 05:03

## 2019-03-29 RX ADMIN — POTASSIUM CHLORIDE 10 MEQ: 7.46 INJECTION, SOLUTION INTRAVENOUS at 12:03

## 2019-03-29 RX ADMIN — IPRATROPIUM BROMIDE AND ALBUTEROL SULFATE 3 ML: .5; 3 SOLUTION RESPIRATORY (INHALATION) at 11:03

## 2019-03-29 RX ADMIN — ZOLPIDEM TARTRATE 5 MG: 5 TABLET ORAL at 12:03

## 2019-03-29 RX ADMIN — POTASSIUM CHLORIDE 10 MEQ: 7.46 INJECTION, SOLUTION INTRAVENOUS at 08:03

## 2019-03-29 RX ADMIN — LEVOFLOXACIN 750 MG: 750 INJECTION, SOLUTION INTRAVENOUS at 06:03

## 2019-03-29 RX ADMIN — QUETIAPINE 25 MG: 25 TABLET ORAL at 09:03

## 2019-03-29 RX ADMIN — QUETIAPINE FUMARATE 12.5 MG: 25 TABLET, FILM COATED ORAL at 10:03

## 2019-03-29 RX ADMIN — VANCOMYCIN HYDROCHLORIDE 1000 MG: 1 INJECTION, POWDER, FOR SOLUTION INTRAVENOUS at 09:03

## 2019-03-29 RX ADMIN — IPRATROPIUM BROMIDE AND ALBUTEROL SULFATE 3 ML: .5; 3 SOLUTION RESPIRATORY (INHALATION) at 04:03

## 2019-03-29 RX ADMIN — ALPRAZOLAM 0.5 MG: 0.25 TABLET ORAL at 12:03

## 2019-03-29 RX ADMIN — DEXMEDETOMIDINE HYDROCHLORIDE 0.2 MCG/KG/HR: 4 INJECTION INTRAVENOUS at 01:03

## 2019-03-29 RX ADMIN — ATORVASTATIN CALCIUM 40 MG: 40 TABLET, FILM COATED ORAL at 08:03

## 2019-03-29 RX ADMIN — POTASSIUM CHLORIDE 10 MEQ: 7.46 INJECTION, SOLUTION INTRAVENOUS at 11:03

## 2019-03-29 RX ADMIN — POTASSIUM CHLORIDE 10 MEQ: 7.46 INJECTION, SOLUTION INTRAVENOUS at 10:03

## 2019-03-29 RX ADMIN — TIOTROPIUM BROMIDE 18 MCG: 18 CAPSULE ORAL; RESPIRATORY (INHALATION) at 07:03

## 2019-03-29 NOTE — PROGRESS NOTES
[facility]  Cardiology  Progress Note    Patient Name: Micky Ochoa  MRN: [id number]  Admission Date: 3/27/2019  Hospital Length of Stay: 2 days  Code Status: No Order   Attending Physician: Abdiel Perez MD   Primary Care Physician: Higinio Brush MD  Expected Discharge Date:   Principal Problem:Chest pain    Subjective:     Hospital Course:   3/27/2019 Presented to the ER with complaints of chest pain. Initial troponin 16.027 with trend down to 15.439. EKG with JERMAINE to anterior leads. BMP WNL. CBC with WBCs 14K and temp 100.4- cultures ordered. Given IV Vanc and Zosyn. Taken to cath lab for emergent procedure  3/28/2019 Emergent LHC yesterday for JERMAINE with occluded LAD treated with LAD CHUN x 3 and elevated LVEDP. Acute distress with hypoxia and tahcypnea noted post procedure. Given IV Lasix and placed on BiPap admitted to ICU. HR elevated in the 120s ST since admission with stable BP. Tachypnea persists with stable sats on BiPap. Repeat IV Lasix ordered. ABG done with pH 7.51 pCO2 30.3 pO2 116 HCO3 24.3 BiPap adjusted from 12/6 to 15/6. BMP with K+ 3.3-replacement ordered. Creatinine 1.0. CBC with WBC 19K-blood cultures pending. Continue GDMT with IV Lasix and BiPap for volume removal. Echocardiogram today with pending results   3/29/2019 Agitated overnight with initiation of Precedex per eICU. Psych consulted with recommendations noted. Off Bipap with improvement in tachypnea noted today. 2.1 liters out overnight and negative 1.6 liters since admission. HR trended down to 90s from 110s-120s. Marginal BP yesterday with trend down to 80s-90s overnight with MAPs [id number] discontinue Toprol XL for now. K+ 2.9 with IV replacement ordered-NPO until seen by speech. Blood cultures with NGTD. Sputum culture pending. Remains on IV abx and appreciate Pulmonary recs        Review of Systems   Unable to perform ROS: other     Objective:     Vital Signs (Most Recent):  Temp: 98.1 °F (36.7 °C) (03/29/19  1109)  Pulse: 103 (03/29/19 1200)  Resp: (!) 30 (03/29/19 1200)  BP: (!) 84/53 (03/29/19 1200)  SpO2: 100 % (03/29/19 1200) Vital Signs (24h Range):  Temp:  [98.1 °F (36.7 °C)-99 °F (37.2 °C)] 98.1 °F (36.7 °C)  Pulse:  [] 103  Resp:  [20-51] 30  SpO2:  [76 %-100 %] 100 %  BP: ()/(46-63) 84/53     Weight: 59.7 kg (131 lb 9.8 oz)  Body mass index is 19.44 kg/m².     SpO2: 100 %  O2 Device (Oxygen Therapy): room air      Intake/Output Summary (Last 24 hours) at 3/29/2019 1354  Last data filed at 3/29/2019 1227  Gross per 24 hour   Intake 1057.13 ml   Output 2125 ml   Net -1067.87 ml       Lines/Drains/Airways     Peripheral Intravenous Line                 Peripheral IV - Single Lumen 03/28/19 1400 Anterior;Left Wrist less than 1 day         Peripheral IV - Single Lumen 03/29/19 0210 Anterior;Distal;Right Forearm less than 1 day                Physical Exam   Constitutional: He has a sickly appearance. He appears ill. No distress.   Cardiovascular: Normal rate and regular rhythm. Exam reveals no gallop.   No murmur heard.  Pulmonary/Chest: Effort normal. No accessory muscle usage. No respiratory distress. He has decreased breath sounds. He has no wheezes.   Abdominal: Soft. Bowel sounds are normal. He exhibits no distension. There is no tenderness.   Neurological: He is alert.   More communicative today with no BiPap intact; appears to answer questions appropriately; denies any pain    Skin: Skin is warm and dry.       Significant Labs:     Recent Labs   Lab 03/29/19  0525      K 2.9*   CL 99   CO2 24   BUN 17   CREATININE 1.2         Significant Imaging:     TTE 3/28/2019    · Normal right ventricular systolic function.  · Severely decreased left ventricular systolic function. The estimated ejection fraction is 25%  · Left ventricular diastolic dysfunction.  · Severe left atrial enlargement.  · Normal central venous pressure (3 mm Hg).  · The estimated PA systolic pressure is 25 mm Hg          Assessment and Plan:         * Chest pain  -related to ischemic etiology and JERMAINE  -s/p LAD CHUN   -denies any chest pain this AM     Acute combined systolic and diastolic heart failure  -echo with severely depressed LV function with EF 25% and diastolic dysfunction  -acute pulmonary edema post procedure with good response with IV Lasix  -may need to be placed on low dose of oral Lasix but will hold off for now due to marginal BP; no ACEI/ARB or BB due to marginal BP   -strict I&Os and daily weights     Leukocytosis  -WBCs 14K upon admission with trend up to 19K yesterday; afebrile overnight  -cultures pending  -continue IV abx  -follow cultures  -repeat CBC in AM     Hypokalemia  -K+ 2.9 this AM  -related to NPO status and IV Lasix  -will replace with IV K+  -recheck K+ and Mg in AM; goal K+ >4.0    Acute respiratory failure  -tachypnea with desaturation post procedure  -LVEDP 30mmHG; CXR with evidence of pulmonary vascular congestion/interstitial edema bilaterally.  -IV diuresis with IV Lasix BID yesterday with 2.1 liters out overnight; negative 1.6 liters since admission; weaned off BiPap overnight to NC  -continue IV Abx until cultures resulted; continue to follow Pulmonary recs   -may need to be on daily Lasix but held today due to marginal BP and improvement in respiratory status     Tachycardia  -HR 110s-120s on telemetry yesterday; HR down to 90s overnight; no  SVT, afib/aflutter or VT noted on telemetry   -was on BB therapy; held due to marginal BP   -likely stress related in setting of ADHF and ?infectious etiology     STEMI (ST elevation myocardial infarction)  -presented with chest pain and JERMAINE to anterior leads  -emergent LHC with LAD CHUN x 3; repeat EKG this AM with continued slight improvement in JERMAINE    -continue GDMT with DAPT and statin therapy; no BB or ACEI due to previously stated reason       Coronary artery disease involving native coronary artery  -presented with STEMI and elevated  troponin  -emergent LHC with LAD CHUN x 3  -continue ASA, statin and Brilinta; no BB or ACEI/ARB due to marginal BP  -echocardiogram yesterday with severely depressed LVEF         VTE Risk Mitigation (From admission, onward)    None      > 40 minutes was spent in the care of this patient for evaluation, critical thinking and decision making. Also discussion with patient as to present condition. Not all time was spent in direct face to face with patient as time was spent reviewing ECGs, CXR, labs, medications and past studies.      HUSAM West, ANP  Cardiology  [facility]

## 2019-03-29 NOTE — ASSESSMENT & PLAN NOTE
-WBCs 14K upon admission with trend up to 19K yesterday; afebrile overnight  -cultures pending  -continue IV abx  -follow cultures  -repeat CBC in AM

## 2019-03-29 NOTE — PLAN OF CARE
Problem: Adult Inpatient Plan of Care  Goal: Plan of Care Review  Outcome: Ongoing (interventions implemented as appropriate) vss. Afebrile. 100% on room air.   Pt aaox1. family at the bed side. Restraints still on even after education. Pt is still restless. Seeing people that are not there in the room. Pt worked with pt today and stood on the side of the bed. Pt used the bedside commode. Safety maintained.

## 2019-03-29 NOTE — ASSESSMENT & PLAN NOTE
-echo with severely depressed LV function with EF 25% and diastolic dysfunction  -acute pulmonary edema post procedure with good response with IV Lasix  -may need to be placed on low dose of oral Lasix but will hold off for now due to marginal BP; no ACEI/ARB or BB due to marginal BP   -strict I&Os and daily weights

## 2019-03-29 NOTE — PLAN OF CARE
Problem: Occupational Therapy Goal  Goal: Occupational Therapy Goal  Goals to be met by: 4/29/19     Patient will increase functional independence with ADLs by performing:    LE Dressing with Supervision.  Grooming while standing with Supervision.  Toileting from toilet with Supervision for hygiene and clothing management.   Supine to sit with Supervision.  Step transfer with Supervision  Toilet transfer to toilet with Supervision.  Upper extremity exercise program x10 reps per handout, with independence.    Outcome: Ongoing (interventions implemented as appropriate)  Pt would benefit from cont OT services in order to maximize functional independence. Recommending return to NH with Part B therapy services. Pt tachy throughout session, increased anxiousness as well.

## 2019-03-29 NOTE — ASSESSMENT & PLAN NOTE
-HR 110s-120s on telemetry yesterday; HR down to 90s overnight; no  SVT, afib/aflutter or VT noted on telemetry   -was on BB therapy; held due to marginal BP   -likely stress related in setting of ADHF and ?infectious etiology

## 2019-03-29 NOTE — PSYCH
"IDENTIFICATION DATA:  This is a 68-year-old  white male who resident of   [facility], referred to ER due to chest pain and shortness of   breath.  This consult is requested by Dr. Perez for agitation.  The patient   is not on a PEC status.    CHIEF COMPLAINT:  "I don't know why I'm here, I want to get out of here."    HISTORY OF PRESENT ILLNESS:  According to ER notes, the patient had chest pain   and shortness breath at [facility].  He was admitted to ICU.  The   patient is agitated, breathless and combative.  He has a history of dementia.    He is in soft restraints for his agitation.  ____.  The patient is not able to   tolerate benzos due to ____.  The patient is demented, does not know why he is   here and what kind of place is this.  The patient states that he lives in   [place] and he lives in his home.  The patient states that he wanted to get out   of this place.  The patient states that sometimes he has been depressed, but   has no anger problem.  He denies any mood swings. The patient denies hearing   voices or seeing things.  He is not responding to internal stimuli.  He is   pleasant in conversation at this time.    PAST PSYCHIATRIC HISTORY:  The patient is an unreliable historian.  He reported   that he has never been in psychiatric hospital.  He is not on any psychotropic   medications.  [facility]  He denies history of   suicide or homicide.    SOCIAL AND FAMILY HISTORY:  The patient was born and raised in [place].  He   described his childhood as good.  The patient states that he was a .  He   informed Dr. Gilmore that he  3 times and has a son, he was not sure how   many children he has altogether.  He lives at [facility], but   believes that he lives in [place] in his own home.    MEDICAL HISTORY:  The patient has COPD, anemia, migraine headaches, chronic   pancreatitis.  His CT scan is not available.  " The patient's WBC is 19.6,   hemoglobin 10.8, hematocrit 36, , platelet 275.  Sodium 138, potassium   2.9, bicarbonate 24, chloride 95, BUN 17, creatinine 1.2, bilirubin 1.2, AST 27,   ALT 14.    MEDICATIONS:  He is on vancomycin, aspirin, albuterol nebulizer, Lasix,   Levaquin, ticagrelor, potassium and p.r.n. Xanax.    MENTAL STATUS EXAMINATION:  This is a 68-year-old tall, alert white male who   knows his age and date of birth.  He is not oriented to day, date, month and   year.  Mood is anxious with constricted to sad affect.  Psychomotor activity is   normal.  Speech is soft, clear, normal in amount, rate and tone.  He is   blinking.  He is vague with his answers, but fluent in conversation.  He was not   seen responding to internal stimuli.  He is coughing.  He is in soft restraint   at this time.  Insight and judgment are impaired.  He is of average intelligent   person.  He denies thoughts of harm to self or others.  He denies hearing voices   or seeing things.    PSYCHIATRIC DIAGNOSES:  AXIS I:  Major neurocognitive disorder, Alzheimer's and vascular type with   behavioral disturbance.  AXIS II:  No diagnosis.  AXIS III:  COPD, anemia, GERD, chronic pancreatitis, migraine headaches.  AXIS IV:  Poor cognition, medical problems.  AXIS V:  35.    RECOMMENDATIONS:  We will start this patient on Seroquel 12.5 in the morning and   25 at nighttime for restlessness, agitation and to prevent restraints and   p.r.n. Haldol.  I will try to reach family for collaborative information.      ALANIS/IN  dd: 03/29/2019 10:16:39 (CDT)  td: 03/29/2019 12:43:38 (CDT)  Doc ID   #[id number]  Job ID #[id number]    CC:

## 2019-03-29 NOTE — PT/OT/SLP EVAL
Occupational Therapy   Evaluation    Name: Micky Ochoa  MRN: [id number]  Admitting Diagnosis:  Chest pain 2 Days Post-Op    Recommendations:     Discharge Recommendations: (NH with part B therapy services)  Discharge Equipment Recommendations:  none  Barriers to discharge:  None(pt resides on dementia unit at [facility] )    Assessment:     Micky Ochoa is a 68 y.o. male with a medical diagnosis of Chest pain.  He presents with impaired cognition; impaired cardio status . Performance deficits affecting function: weakness, impaired self care skills, impaired balance, impaired functional mobilty, impaired endurance, gait instability, impaired cardiopulmonary response to activity, decreased safety awareness, decreased ROM, impaired cognition.      Pt would benefit from cont OT services in order to maximize functional independence. Recommending return to NH with Part B therapy services. Pt tachy throughout session, increased anxiousness as well.     Rehab Prognosis: fair + 2/2 impaired cognition; patient would benefit from acute skilled OT services to address these deficits and reach maximum level of function.       Plan:     Patient to be seen 5 x/week to address the above listed problems via self-care/home management, therapeutic activities, therapeutic exercises  · Plan of Care Expires: 04/29/19  · Plan of Care Reviewed with: patient    Subjective     Chief Complaint: pt with confusion throughout session; inquiring where his children are at this time and asking if therapists have seen them   Patient/Family Comments/goals: none stated     Occupational Profile:  Living Environment: Pt is a resident of [facility], dementia unit; nursing staff report that pt is supervision level of assist for ADLs and functional mobility w/ or without SPC; oriented to self only mostly   Equipment Used at Home:  cane, straight  Assistance upon Discharge: from NH staff     Pain/Comfort:  · Pain Rating 1: 0/10    Patients  cultural, spiritual, Mu-ism conflicts given the current situation:      Objective:     Communicated with: juan pablo prior to session.      General Precautions: Standard, fall, aspiration, respiratory   Orthopedic Precautions:N/A   Braces: N/A     Occupational Performance:    Bed Mobility:    · Patient completed Scooting/Bridging with minimum assistance  · Patient completed Supine to Sit with minimum assistance  · Patient completed Sit to Supine with minimum assistance    Functional Mobility/Transfers:  · Patient completed Sit <> Stand Transfer with minimum assistance  with  hand-held assist   · Functional Mobility: Min A with HHA of 2     Activities of Daily Living:  · N/A     Cognitive/Visual Perceptual:  Cognitive/Psychosocial Skills:     -       Oriented to: Person   -       Follows Commands/attention:Inattentive, Easily distracted and Follows one-step commands  -       Communication: mumbled speech   -       Memory: Impaired STM, Impaired LTM and Poor immediate recall  -       Safety awareness/insight to disability: impaired   -       Mood/Affect/Coping skills/emotional control: Appropriate to situation    Physical Exam:  Balance:    -       CGA sitting balance; Min A standing   Postural examination/scapula alignment:    -       Rounded shoulders  Skin integrity: Visible skin intact  Edema:  None noted  Upper Extremity Range of Motion:   BUE ROM WFL for pt's needs   Upper Extremity Strength:  Unable to accurately assess 2/2 impaired cognition; appears slightly diminished    Strength:  Good     AMPAC 6 Click ADL:  AMPAC Total Score: 17    Treatment & Education:  Pt with impaired cognition   Increased/labor breathing noted throughout session, however, pt denying SOB at this time; HR elevated throughout session with minimal axs (120's)  Functional stands x 3 with Min A and HHA of 2 for safety   Functional mobility x 2 trials with Min A and HHA of 2   Pt reporting fatigue and also demonstrating increased  anxiousness; return to supine   Education:    Patient left supine with all lines intact, call button in reach, bed alarm on, restraints reapplied at end of session and nsg notified    GOALS:   Multidisciplinary Problems     Occupational Therapy Goals        Problem: Occupational Therapy Goal    Goal Priority Disciplines Outcome Interventions   Occupational Therapy Goal     OT, PT/OT Ongoing (interventions implemented as appropriate)    Description:  Goals to be met by: 4/29/19     Patient will increase functional independence with ADLs by performing:    LE Dressing with Supervision.  Grooming while standing with Supervision.  Toileting from toilet with Supervision for hygiene and clothing management.   Supine to sit with Supervision.  Step transfer with Supervision  Toilet transfer to toilet with Supervision.  Upper extremity exercise program x10 reps per handout, with independence.                      History:     Past Medical History:   Diagnosis Date    Anemia     COPD (chronic obstructive pulmonary disease)     Dementia     GERD (gastroesophageal reflux disease)     Migraine headache     Neuropathy     Pancreatitis, chronic        Past Surgical History:   Procedure Laterality Date    ANGIOGRAM, CORONARY ARTERY N/A 3/27/2019    Performed by Abdiel Perez MD at [facility] CATH LAB/EP    CATHETERIZATION, HEART, LEFT Left 3/27/2019    Performed by Abdiel Perez MD at [facility] CATH LAB/EP    Percutaneous coronary intervention N/A 3/27/2019    Performed by Abdiel Perez MD at [facility] CATH LAB/EP    Stent, Drug Eluting, Single Vessel, Coronary  3/27/2019    Performed by Abdiel Perez MD at [facility] CATH LAB/EP    TONSILLECTOMY      Ultrasound-coronary N/A 3/27/2019    Performed by Abdiel Perez MD at [facility] CATH LAB/EP       Time Tracking:     OT Date of Treatment: 03/29/19  OT Start Time: 1341  OT Stop Time: 1400  OT Total Time (min): 19 min    Billable Minutes:Evaluation 19    Kianna Thayer OT  3/29/2019

## 2019-03-29 NOTE — PT/OT/SLP EVAL
Physical Therapy Evaluation    Patient Name:  Micky Ochoa   MRN:  [id number]    Recommendations:     Discharge Recommendations:  (NH with Part B therapy services)   Discharge Equipment Recommendations: none  Barriers to discharge:  None(pt resides on dementia unit at [facility] )        Assessment:     Micky Ochoa is a 68 y.o. male admitted with a medical diagnosis of Chest pain.  He presents with the following impairments/functional limitations:  weakness, gait instability, impaired functional mobilty, impaired endurance, impaired self care skills, impaired balance, decreased ROM, impaired cognition, decreased safety awareness, impaired cardiopulmonary response to activity Patient anxious and tachy throughout session.    Rehab Prognosis: Fair+; patient would benefit from acute skilled PT services to address these deficits and reach maximum level of function.    Recent Surgery: Procedure(s) (LRB):  CATHETERIZATION, HEART, LEFT (Left)  Stent, Drug Eluting, Single Vessel, Coronary  Percutaneous coronary intervention (N/A)  ANGIOGRAM, CORONARY ARTERY (N/A)  Ultrasound-coronary (N/A) 2 Days Post-Op    Plan:     During this hospitalization, patient to be seen 5 x/week to address the identified rehab impairments via gait training, therapeutic activities, therapeutic exercises and progress toward the following goals:    · Plan of Care Expires:  04/29/19    Subjective     Chief Complaint: pt with confusion throughout session; inquiring where his children are at this time and asking if therapists have seen them   Patient/Family Comments/goals: none stated  Pain/Comfort:  · Pain Rating 1: 0/10  · Pain Rating Post-Intervention 1: 0/10    Patients cultural, spiritual, Moravian conflicts given the current situation: no    Living Environment:  Living Environment: Pt is a resident of [facility], dementia unit; nursing staff report that pt is supervision level of assist for ADLs and functional mobility w/ or without  SPC; oriented to self only mostly   Equipment Used at Home:  cane, straight  Assistance upon Discharge: from NH staff       Objective:     Communicated with nurse prior to session.  Patient found with peripheral IV(ICU monitoring)  upon PT entry to room.    General Precautions: Standard, fall, aspiration, respiratory   Orthopedic Precautions:N/A   Braces: N/A     Exams:  · Cognition: Oriented to person; inattentive, easily distracted and follows one step commands  · Posture: Round shoulders  · Skin Integrity: Visible skin intact  · BLEs WFL for range during bed mob/transfers/amb  · BLE strength: unable to accurately test 2/2 impaired cognition; strength appears minimally decreased  Functional Mobility:  · Bed Mobility:     · Scooting: minimum assistance  · Supine to Sit: minimum assistance  · Sit to Supine: minimum assistance  · Transfers:     · Sit to Stand:  minimum assistance with hand-held assist  · Gait: Patient amb 2x 10ft with Micheal/HHA x 2 with minimal instability throughout; short step length, slow gait  · Balance: sit~fair; stand~fair-; amb~fair- to poor+    Therapeutic Activities and Exercises:   Increased SOB/work of breathing during session but pt denies SOB at this time; HR elevated throughout session 100-120; pt performed 3 functional stands with min/HHA x 2 for safety; amb x 2 trials as described above; pt reporting fatigue following session with increased anxiousness; returned to supine; vitals were as follows:  Supine 98/56  O2 sats 100 (throughout session)  Sitting 98/52     Standing 92/55    After amb 101/57    After 2nd amb 107/58      AM-PAC 6 CLICK MOBILITY  Total Score:16     Patient left supine with all lines intact, call button in reach, bed alarm on, restraints reapplied post session and nurse notified    GOALS:   Multidisciplinary Problems     Physical Therapy Goals        Problem: Physical Therapy Goal    Goal Priority Disciplines Outcome Goal Variances  Interventions   Physical Therapy Goal     PT, PT/OT Ongoing (interventions implemented as appropriate)     Description:  Goals to be met by: 2019     Patient will increase functional independence with mobility by performin. Supine to sit with supervision  2. Sit to supine with supervision  3. Rolling to Left and Right with Modified [place].  4. Sit to stand transfer with Supervision  5. Gait  x 100 feet with Stand-by Assistance/supervision with or without AD   6. Lower extremity exercise program x10-15 reps with supervision                      History:     Past Medical History:   Diagnosis Date    Anemia     COPD (chronic obstructive pulmonary disease)     Dementia     GERD (gastroesophageal reflux disease)     Migraine headache     Neuropathy     Pancreatitis, chronic        Past Surgical History:   Procedure Laterality Date    ANGIOGRAM, CORONARY ARTERY N/A 3/27/2019    [facility] CATH LAB/EP    CATHETERIZATION, HEART, LEFT Left 3/27/2019    Performed by [facility]iel [facility] at [facility] CATH LAB/EP    Percutaneous coronary intervention N/A 3/27/2019    Performed by Abdiel [facility] at [facility] CATH LAB/EP    Stent, Drug Eluting, Single Vessel, Coronary  3/27/2019    Performed by [facility]iel Perez [facility] at [facility] CATH LAB/EP    TONSILLECTOMY      Ultrasound-coronary N/A 3/27/2019    Performed by Abdiel Perez MD at [facility] CATH LAB/EP       Time Tracking:     PT Received On: 19  PT Start Time: 1341     PT Stop Time: 1400  PT Total Time (min): 19 min with OT    Billable Minutes: Evaluation 19 minutes      Jadyn Torres, PT  2019

## 2019-03-29 NOTE — PLAN OF CARE
Problem: Restraint, Nonbehavioral (Nonviolent)  Intervention: Education     03/29/19 0543   Education   Discontinuation Criteria Verbalizes not to harm self;Verbalizes not to harm others;Absence of behavior that required restraint   Criteria Explained Yes   Patient / Family Teaching Restraint monitoring;Need for restraint   Patient's Response NL   Patient / Family Notification Patient       Goal: Discontinuation Criteria Achieved    Intervention: Implement Least-restrictive Safety Strategies     03/29/19 0543   Implement Least-restrictive Safety Strategies   Less Restrictive Alternatives environment adjusted;calming techniques promoted;medication offered;sensory stimulation limited;safety enhancements provided;1:1 observation maintained   Medical Device Protection IV pole/bag removed from visual field   Prevent and Minimize Fear   Diversional Activities television       Goal: Personal Dignity and Safety Maintained    Intervention: Protect Dignity, Rights, and Personal Wellbeing     03/29/19 0543   Support Dyspnea Relief   Trust Relationship/Rapport care explained;choices provided;emotional support provided;empathic listening provided;questions answered;questions encouraged;reassurance provided;thoughts/feelings acknowledged     Intervention: Protect Skin and Joint Integrity     03/29/19 0543   Prevent Additional Skin Injury   Body Position positioned/repositioned independently;weight shift assistance provided   Optimize Functional Ability   Range of Motion active ROM (range of motion) encouraged

## 2019-03-29 NOTE — ASSESSMENT & PLAN NOTE
-K+ 2.9 this AM  -related to NPO status and IV Lasix  -will replace with IV K+  -recheck K+ and Mg in AM; goal K+ >4.0

## 2019-03-29 NOTE — PROGRESS NOTES
Pt placed on Bipap @ 0044, pt is agitated, anxious and refuses to remain in Bipap. Pt continued to take bipap mask off and asked numerous times not to. Pt was placed back on o2 2L. RN at bedside, will cont to monitor.

## 2019-03-29 NOTE — PLAN OF CARE
Problem: SLP Goal  Goal: SLP Goal  Short Term Goals:  1. Pt will participate in BSS to determine least restrictive diet.-ongoing  2. Pt will tolerate full liquids PO diet with NECTAR thick liquids with no overt s/s of aspiration.      Outcome: Ongoing (interventions implemented as appropriate)  3/29:  Pt participated in clinical swallow eval this AM. Pt tolerated demosntrated overt s/s of aspiration with THIN liquids. However, pt tolerated nectar thick liquids and puree textures with no overt s/s of aspiration, at bedside. During mechanical soft textures trial, pt demonstrated dcr'd respiratory support c/b short, shallow breaths, mouth breathing and incr'd resp rate (up to 45).   SLP recs: FULL Liquids Diet with NECTAR thick liquid consistency, crushed PO meds in puree textures, slow rate of intake, and assist thickening all liquids. SLP will continue to follow. SLP notified EDINSON Zamora and MD Perez of results/recs.   NICOLA Rollins., CCC-SLP  Speech-Language Pathologist

## 2019-03-29 NOTE — PROGRESS NOTES
Pt remains confused and in restraints; no family at bedside. Pt not stable for d/c.  Pt resident at [facility]. Tn will send clinicals via [facility].  Tn to continue to follow.

## 2019-03-29 NOTE — PROGRESS NOTES
"Vancomycin Dosing and Monitoring Pharmacy Protocol    Micky Ochoa is a 68 y.o. male    Height: 5' 9" (1.753 m)   Wt Readings from Last 3 Encounters:   03/29/19 59.7 kg (131 lb 9.8 oz)       Temp Readings from Last 3 Encounters:   03/29/19 98.1 °F (36.7 °C) (Oral)      Lab Results   Component Value Date/Time    WBC 19.69 (H) 03/28/2019 09:47 AM    WBC 14.83 (H) 03/27/2019 09:06 PM      Lab Results   Component Value Date/Time    CREATININE 1.2 03/29/2019 05:25 AM    CREATININE 1.0 03/28/2019 01:49 AM    CREATININE 1.1 03/27/2019 09:06 PM      Lab Results   Component Value Date/Time    LACTATE 1.8 03/28/2019 01:49 AM    LACTATE 1.9 03/27/2019 09:03 PM       Serum creatinine: 1.2 mg/dL 03/29/19 0525  Estimated creatinine clearance: 49.1 mL/min    Antibiotics (From admission, onward)      Start     Stop Route Frequency Ordered    03/29/19 2100  vancomycin in dextrose 5 % 1 gram/250 mL IVPB 1,000 mg      -- IV Every 24 hours (non-standard times) 03/29/19 1545    03/28/19 1530  levoFLOXacin 750 mg/150 mL IVPB 750 mg      -- IV Every 24 hours (non-standard times) 03/28/19 1429    03/28/19 1400  piperacillin-tazobactam 4.5 g in dextrose 5 % 100 mL IVPB (ready to mix system)      -- IV Every 8 hours (non-standard times) 03/28/19 1240    03/28/19 1400  vancomycin (VANCOCIN) 1,750 mg in dextrose 5 % 500 mL IVPB  (Vancomycin IVPB with levels panel)      -- IV Once 03/28/19 1304    03/27/19 2130  vancomycin in dextrose 5 % 1 gram/250 mL IVPB 1,000 mg  (ED Adult Sepsis Treatment)      03/28 0929 IV ED 1 Time 03/27/19 2124          Antifungals (From admission, onward)      None            Microbiology Results (last 7 days)       Procedure Component Value Units Date/Time    Blood culture [[id number]] Collected:  03/27/19 2103    Order Status:  Completed Specimen:  Blood Updated:  03/29/19 0812     Blood Culture, Routine No Growth to date     Blood Culture, Routine No Growth to date    Blood culture x two cultures. Draw prior to " antibiotics. [[id number]] Collected:  19 0031    Order Status:  Completed Specimen:  Blood Updated:  19 0812     Blood Culture, Routine No Growth to date     Blood Culture, Routine No Growth to date    Narrative:       Aerobic and anaerobic    Culture, Respiratory with Gram Stain [[id number]]     Order Status:  No result Specimen:  Respiratory     Influenza A & B by Molecular [[id number]] Collected:  19    Order Status:  Completed Specimen:  Nasopharyngeal Swab Updated:  19     Influenza A, Molecular Negative     Influenza B, Molecular Negative     Flu A & B Source Nasal swab    Blood culture x two cultures. Draw prior to antibiotics. [[id number]] Collected:  19    Order Status:  Sent Specimen:  Blood from Peripheral, Wrist, Right Updated:  19            Indication/Target trough:   Pneumonia (Target trough: 15-20mcg/ml)    Hemodialysis:   N/A    Dosing Weight:   Wt Readings from Last 1 Encounters:   19 59.7 kg (131 lb 9.8 oz)       Last Vancomycin dose: 1750 mg   Date/Time given: 3/28 2100          Vancomycin level:  No results for input(s): VANCOMYCIN-TROUGH in the last 72 hours.  No results for input(s): VANCOMYCIN, RANDOM in the last 72 hours.    Per Protocol Initial/Adjustments Dosin. Initial/Adjustment Dose: DECREASE Vancomycin will be adjusted from 1750mg q24hr to 1000mg q24hr  2. Vancomycin Trough Level will be drawn on 3/30 2000date/time     Pharmacy will continue to follow.    Please contact if you have any further questions. Thank you.    Yevgeniy Rodriguez, PharmD  [phone]

## 2019-03-29 NOTE — EICU
eICU Note :    Called by the [facility] eRN:    Problem: Agitation post Cardiac cath in a case with STEMI, it has already been tried on Haldol    Pertinent History and labs reviewed : 69 y/o male   has Chest pain; Coronary artery disease involving native coronary artery; STEMI (ST elevation myocardial infarction); Tachycardia; Acute respiratory failure; Hypokalemia; and Leukocytosis on their problem list.        Treatment /Intervention given: Preceedex GTT         Willow Cherry M.D  eICU Physician

## 2019-03-29 NOTE — SUBJECTIVE & OBJECTIVE
Review of Systems   Unable to perform ROS: other     Objective:     Vital Signs (Most Recent):  Temp: 98.1 °F (36.7 °C) (03/29/19 1109)  Pulse: 103 (03/29/19 1200)  Resp: (!) 30 (03/29/19 1200)  BP: (!) 84/53 (03/29/19 1200)  SpO2: 100 % (03/29/19 1200) Vital Signs (24h Range):  Temp:  [98.1 °F (36.7 °C)-99 °F (37.2 °C)] 98.1 °F (36.7 °C)  Pulse:  [] 103  Resp:  [20-51] 30  SpO2:  [76 %-100 %] 100 %  BP: ()/(46-63) 84/53     Weight: 59.7 kg (131 lb 9.8 oz)  Body mass index is 19.44 kg/m².     SpO2: 100 %  O2 Device (Oxygen Therapy): room air      Intake/Output Summary (Last 24 hours) at 3/29/2019 1354  Last data filed at 3/29/2019 1227  Gross per 24 hour   Intake 1057.13 ml   Output 2125 ml   Net -1067.87 ml       Lines/Drains/Airways     Peripheral Intravenous Line                 Peripheral IV - Single Lumen 03/28/19 1400 Anterior;Left Wrist less than 1 day         Peripheral IV - Single Lumen 03/29/19 0210 Anterior;Distal;Right Forearm less than 1 day                Physical Exam   Constitutional: He has a sickly appearance. He appears ill. No distress.   Cardiovascular: Normal rate and regular rhythm. Exam reveals no gallop.   No murmur heard.  Pulmonary/Chest: Effort normal. No accessory muscle usage. No respiratory distress. He has decreased breath sounds. He has no wheezes.   Abdominal: Soft. Bowel sounds are normal. He exhibits no distension. There is no tenderness.   Neurological: He is alert.   More communicative today with no BiPap intact; appears to answer questions appropriately; denies any pain    Skin: Skin is warm and dry.       Significant Labs:     Recent Labs   Lab 03/29/19  0525      K 2.9*   CL 99   CO2 24   BUN 17   CREATININE 1.2         Significant Imaging:     TTE 3/28/2019    · Normal right ventricular systolic function.  · Severely decreased left ventricular systolic function. The estimated ejection fraction is 25%  · Left ventricular diastolic dysfunction.  · Severe  left atrial enlargement.  · Normal central venous pressure (3 mm Hg).  · The estimated PA systolic pressure is 25 mm Hg

## 2019-03-29 NOTE — ASSESSMENT & PLAN NOTE
-tachypnea with desaturation post procedure  -LVEDP 30mmHG; CXR with evidence of pulmonary vascular congestion/interstitial edema bilaterally.  -IV diuresis with IV Lasix BID yesterday with 2.1 liters out overnight; negative 1.6 liters since admission; weaned off BiPap overnight to NC  -continue IV Abx until cultures resulted; continue to follow Pulmonary recs   -may need to be on daily Lasix but held today due to marginal BP and improvement in respiratory status

## 2019-03-29 NOTE — PLAN OF CARE
Problem: Physical Therapy Goal  Goal: Physical Therapy Goal  Goals to be met by: 2019     Patient will increase functional independence with mobility by performin. Supine to sit with supervision  2. Sit to supine with supervision  3. Rolling to Left and Right with Modified [place].  4. Sit to stand transfer with Supervision  5. Gait  x 100 feet with Stand-by Assistance/supervision with or without AD   6. Lower extremity exercise program x10-15 reps with supervision    Outcome: Ongoing (interventions implemented as appropriate)  Patient evaluated; exhibited anxiety and tachycardia during session; will benefit from PT services to maximize functional status; recommend return to NH with Part B therapy services.

## 2019-03-29 NOTE — ASSESSMENT & PLAN NOTE
-presented with STEMI and elevated troponin  -emergent LHC with LAD CHUN x 3  -continue ASA, statin and Brilinta; no BB or ACEI/ARB due to marginal BP  -echocardiogram yesterday with severely depressed LVEF

## 2019-03-29 NOTE — PT/OT/SLP EVAL
Speech Language Pathology Evaluation  Bedside Swallow    Patient Name:  Micky Ochoa   MRN:  [id number]  Admitting Diagnosis: Chest pain    Recommendations:                 General Recommendations:  Dysphagia therapy--ongoing swallow assessment  Diet recommendations:  NPO, Full liquids, Nectar Thick   Aspiration Precautions: 1 bite/sip at a time, Alternating bites/sips, Assistance with meals, Assistance with thickening liquids, Eliminate distractions, Feed only when awake/alert, Frequent oral care, HOB to 90 degrees, Meds crushed in puree, Monitor for s/s of aspiration, No straws, Remain upright 30 minutes post meal, Small bites/sips and Standard aspiration precautions   General Precautions: Standard, aspiration, fall, respiratory  Communication strategies:  none    History:   Chief Complaint:  Chest pain      HPI: presented with acute chest pressure. No prior records in [facility] EMR. ecg was significant for acute JERMAINE in the v1-3 and reciporal ST depression in lateral leads.  Will call code stemi     Past Medical History:   Diagnosis Date    Anemia     COPD (chronic obstructive pulmonary disease)     Dementia     GERD (gastroesophageal reflux disease)     Migraine headache     Neuropathy     Pancreatitis, chronic        Past Surgical History:   Procedure Laterality Date    ANGIOGRAM, CORONARY ARTERY N/A 3/27/2019    Performed by [facility]iel [facility] at [facility] CATH LAB/EP    CATHETERIZATION, HEART, LEFT Left 3/27/2019    Performed by [facility]iel [facility] at [facility] CATH LAB/EP    Percutaneous coronary intervention N/A 3/27/2019    Performed by [facility]iel [facility] at [facility] CATH LAB/EP    Stent, Drug Eluting, Single Vessel, Coronary  3/27/2019    Performed by [facility]iel [facility] at [facility] CATH LAB/EP    TONSILLECTOMY      Ultrasound-coronary N/A 3/27/2019    Performed by Abdiel Perez [facility] at [facility] CATH LAB/EP       Social History: Patient lives at NH.    Prior Intubation HX:  N/A    Modified Barium Swallow: None on file  "    Chest X-Rays: Marked pulmonary vascular congestion/interstitial edema bilaterally.  Superimposed infectious process not excluded.    Prior diet: Per pt, regular/thin liquids PO diet.      Subjective     SLP consulted for clinical swallow eval. SLP confirmed with RN prior to entry. Pt found in bed asleep with bilat wrist restraints. Pt woke with verbal cuing from SLP. Pt perseverative on SLP removing restraints.    Patient goals: "I can't do that water anymore" re: aspirated thin liquids PO trials.     Pain/Comfort:  · Pain Rating 1: 0/10    Objective:     Oral Musculature Evaluation  · Oral Musculature: general weakness  · Dentition: edentulous  · Mucosal Quality: adequate  · Mandibular Strength and Mobility: (reduced strength)  · Oral Labial Strength and Mobility: (reduced strength)  · Lingual Strength and Mobility: (reduced strength)  · Buccal Strength and Mobility: (dcr'd strength)  · Volitional Swallow: (dcr'd laryngeal elevation/excursion, fairly timely trigger of swallow per hand palpation)  · Voice Prior to PO Intake: (mumbled speech production)    Bedside Swallow Eval: Pt participated in clinical swallow eval this AM. Pt tolerated demosntrated overt s/s of aspiration with THIN liquids. However, pt tolerated nectar thick liquids and puree textures with no overt s/s of aspiration, at bedside. During mechanical soft textures trial, pt demonstrated dcr'd respiratory support c/b short, shallow breaths, mouth breathing and incr'd resp rate (up to 45).   Consistencies Assessed:  · Thin liquids --via cup sips of water x3  · Nectar thick liquids -via cup sips of water >x10  · Puree (pudding) regulated tsp bites >x5  · Soft solids -(softened malorie cracker) x1     Oral Phase:   For mechanical soft textures:  · Prolonged mastication   · Mouth breathing--difficulty coordinating breathing to mastication/swallow    Pharyngeal Phase:   · Coughing/choking--for thin liquids  · decreased hyolaryngeal excursion to " palpation-- for all PO trials    Compensatory Strategies  · Volitional cough/throat clear--thin liquids    Treatment and Education: SLP  Will continue to follow to assess pt's swallow mechanism and advance as tolerated. SLP educated pt on role of SLP, clinical swallow eval, diet recs/swallow precautions and POC. Pt acknowledged and confirmed understanding.         Assessment:     Micky Ochoa is a 68 y.o. male admitted with Chest pain.  He presents with immediate coughing/choking s/p swallow thin liquids, as well as dcr'd respiratory support (intermittently) c/b short shallow breaths, mouth breathing, requiring incr'd time between PO trials, and incr'd respiratory rate (up to 45) during PO intake.   SLP recs: FULL Liquids Diet with NECTAR thick liquid consistency, crushed PO meds in puree textures, slow rate of intake, and assist thickening all liquids. SLP will continue to follow. SLP notified EDINSON Zamora and MD ePrez of results/recs.     Goals:   Multidisciplinary Problems     SLP Goals        Problem: SLP Goal    Goal Priority Disciplines Outcome   SLP Goal     SLP Ongoing (interventions implemented as appropriate)   Description:  Short Term Goals:  1. Pt will participate in BSS to determine least restrictive diet.-ongoing  2. Pt will tolerate full liquids PO diet with NECTAR thick liquids with no overt s/s of aspiration.                        Plan:     · Patient to be seen:  3 x/week   · Plan of Care expires:  04/28/19  · Plan of Care reviewed with:  patient(EDINSON Zamora and MD Perez)   · SLP Follow-Up:  Yes       Discharge recommendations:  (TBD)     Time Tracking:     SLP Treatment Date:   03/29/19  Speech Start Time:  0920  Speech Stop Time:  0935     Speech Total Time (min):  15 min    Billable Minutes: Eval Swallow and Oral Function 15    AAKASH Rollins, CCC-SLP  03/29/2019

## 2019-03-29 NOTE — ASSESSMENT & PLAN NOTE
-presented with chest pain and JERMAINE to anterior leads  -emergent LHC with LAD CHUN x 3; repeat EKG this AM with continued slight improvement in JERMAINE    -continue GDMT with DAPT and statin therapy; no BB or ACEI due to previously stated reason

## 2019-03-30 LAB
ANION GAP SERPL CALC-SCNC: 13 MMOL/L (ref 8–16)
BASOPHILS # BLD AUTO: 0.18 K/UL (ref 0–0.2)
BASOPHILS NFR BLD: 1.5 % (ref 0–1.9)
BUN SERPL-MCNC: 13 MG/DL (ref 8–23)
CALCIUM SERPL-MCNC: 9 MG/DL (ref 8.7–10.5)
CHLORIDE SERPL-SCNC: 100 MMOL/L (ref 95–110)
CO2 SERPL-SCNC: 23 MMOL/L (ref 23–29)
CREAT SERPL-MCNC: 1.1 MG/DL (ref 0.5–1.4)
DIFFERENTIAL METHOD: ABNORMAL
EOSINOPHIL # BLD AUTO: 0 K/UL (ref 0–0.5)
EOSINOPHIL NFR BLD: 0 % (ref 0–8)
ERYTHROCYTE [DISTWIDTH] IN BLOOD BY AUTOMATED COUNT: 16.1 % (ref 11.5–14.5)
EST. GFR  (AFRICAN AMERICAN): >60 ML/MIN/1.73 M^2
EST. GFR  (NON AFRICAN AMERICAN): >60 ML/MIN/1.73 M^2
GLUCOSE SERPL-MCNC: 112 MG/DL (ref 70–110)
HCT VFR BLD AUTO: 35.6 % (ref 40–54)
HGB BLD-MCNC: 11 G/DL (ref 14–18)
LYMPHOCYTES # BLD AUTO: 0.9 K/UL (ref 1–4.8)
LYMPHOCYTES NFR BLD: 7.8 % (ref 18–48)
MCH RBC QN AUTO: 32.1 PG (ref 27–31)
MCHC RBC AUTO-ENTMCNC: 30.9 G/DL (ref 32–36)
MCV RBC AUTO: 104 FL (ref 82–98)
MONOCYTES # BLD AUTO: 1 K/UL (ref 0.3–1)
MONOCYTES NFR BLD: 8.3 % (ref 4–15)
NEUTROPHILS # BLD AUTO: 9.7 K/UL (ref 1.8–7.7)
NEUTROPHILS NFR BLD: 81.2 % (ref 38–73)
PLATELET # BLD AUTO: 302 K/UL (ref 150–350)
PMV BLD AUTO: 12.5 FL (ref 9.2–12.9)
POTASSIUM SERPL-SCNC: 2.9 MMOL/L (ref 3.5–5.1)
RBC # BLD AUTO: 3.43 M/UL (ref 4.6–6.2)
SODIUM SERPL-SCNC: 136 MMOL/L (ref 136–145)
VANCOMYCIN TROUGH SERPL-MCNC: 10.2 UG/ML (ref 10–22)
WBC # BLD AUTO: 11.99 K/UL (ref 3.9–12.7)

## 2019-03-30 PROCEDURE — 99291 CRITICAL CARE FIRST HOUR: CPT | Mod: ,,, | Performed by: INTERNAL MEDICINE

## 2019-03-30 PROCEDURE — 97530 THERAPEUTIC ACTIVITIES: CPT

## 2019-03-30 PROCEDURE — 94640 AIRWAY INHALATION TREATMENT: CPT

## 2019-03-30 PROCEDURE — 20000000 HC ICU ROOM

## 2019-03-30 PROCEDURE — 99291 PR CRITICAL CARE, E/M 30-74 MINUTES: ICD-10-PCS | Mod: ,,, | Performed by: INTERNAL MEDICINE

## 2019-03-30 PROCEDURE — 36415 COLL VENOUS BLD VENIPUNCTURE: CPT

## 2019-03-30 PROCEDURE — 80048 BASIC METABOLIC PNL TOTAL CA: CPT

## 2019-03-30 PROCEDURE — 25000003 PHARM REV CODE 250: Performed by: PSYCHIATRY & NEUROLOGY

## 2019-03-30 PROCEDURE — 94761 N-INVAS EAR/PLS OXIMETRY MLT: CPT

## 2019-03-30 PROCEDURE — 25000003 PHARM REV CODE 250: Performed by: INTERNAL MEDICINE

## 2019-03-30 PROCEDURE — 25000003 PHARM REV CODE 250: Performed by: STUDENT IN AN ORGANIZED HEALTH CARE EDUCATION/TRAINING PROGRAM

## 2019-03-30 PROCEDURE — 25000003 PHARM REV CODE 250: Performed by: NURSE PRACTITIONER

## 2019-03-30 PROCEDURE — 63600175 PHARM REV CODE 636 W HCPCS: Performed by: NURSE PRACTITIONER

## 2019-03-30 PROCEDURE — 25000242 PHARM REV CODE 250 ALT 637 W/ HCPCS: Performed by: STUDENT IN AN ORGANIZED HEALTH CARE EDUCATION/TRAINING PROGRAM

## 2019-03-30 PROCEDURE — 63600175 PHARM REV CODE 636 W HCPCS: Performed by: INTERNAL MEDICINE

## 2019-03-30 PROCEDURE — 85025 COMPLETE CBC W/AUTO DIFF WBC: CPT

## 2019-03-30 PROCEDURE — 63600175 PHARM REV CODE 636 W HCPCS: Performed by: STUDENT IN AN ORGANIZED HEALTH CARE EDUCATION/TRAINING PROGRAM

## 2019-03-30 PROCEDURE — 80202 ASSAY OF VANCOMYCIN: CPT

## 2019-03-30 PROCEDURE — 92526 ORAL FUNCTION THERAPY: CPT

## 2019-03-30 RX ORDER — FUROSEMIDE 10 MG/ML
80 INJECTION INTRAMUSCULAR; INTRAVENOUS ONCE
Status: DISCONTINUED | OUTPATIENT
Start: 2019-03-30 | End: 2019-03-30

## 2019-03-30 RX ORDER — FUROSEMIDE 10 MG/ML
40 INJECTION INTRAMUSCULAR; INTRAVENOUS ONCE
Status: COMPLETED | OUTPATIENT
Start: 2019-03-30 | End: 2019-03-30

## 2019-03-30 RX ORDER — NITROGLYCERIN 0.4 MG/1
0.4 TABLET SUBLINGUAL EVERY 5 MIN PRN
Status: DISCONTINUED | OUTPATIENT
Start: 2019-03-30 | End: 2019-04-02 | Stop reason: HOSPADM

## 2019-03-30 RX ORDER — POTASSIUM CHLORIDE 20 MEQ/1
60 TABLET, EXTENDED RELEASE ORAL
Status: COMPLETED | OUTPATIENT
Start: 2019-03-30 | End: 2019-03-30

## 2019-03-30 RX ORDER — FUROSEMIDE 10 MG/ML
40 INJECTION INTRAMUSCULAR; INTRAVENOUS ONCE
Status: DISCONTINUED | OUTPATIENT
Start: 2019-03-30 | End: 2019-03-30

## 2019-03-30 RX ORDER — MORPHINE SULFATE 2 MG/ML
2 INJECTION, SOLUTION INTRAMUSCULAR; INTRAVENOUS ONCE
Status: COMPLETED | OUTPATIENT
Start: 2019-03-30 | End: 2019-03-30

## 2019-03-30 RX ADMIN — IPRATROPIUM BROMIDE AND ALBUTEROL SULFATE 3 ML: .5; 3 SOLUTION RESPIRATORY (INHALATION) at 07:03

## 2019-03-30 RX ADMIN — VANCOMYCIN HYDROCHLORIDE 1000 MG: 1 INJECTION, POWDER, FOR SOLUTION INTRAVENOUS at 09:03

## 2019-03-30 RX ADMIN — TICAGRELOR 90 MG: 90 TABLET ORAL at 08:03

## 2019-03-30 RX ADMIN — FLUTICASONE FUROATE AND VILANTEROL TRIFENATATE 1 PUFF: 100; 25 POWDER RESPIRATORY (INHALATION) at 11:03

## 2019-03-30 RX ADMIN — PIPERACILLIN AND TAZOBACTAM 4.5 G: 4; .5 INJECTION, POWDER, LYOPHILIZED, FOR SOLUTION INTRAVENOUS; PARENTERAL at 01:03

## 2019-03-30 RX ADMIN — IPRATROPIUM BROMIDE AND ALBUTEROL SULFATE 3 ML: .5; 3 SOLUTION RESPIRATORY (INHALATION) at 11:03

## 2019-03-30 RX ADMIN — FUROSEMIDE 40 MG: 10 INJECTION, SOLUTION INTRAMUSCULAR; INTRAVENOUS at 11:03

## 2019-03-30 RX ADMIN — POTASSIUM CHLORIDE 60 MEQ: 20 TABLET, EXTENDED RELEASE ORAL at 08:03

## 2019-03-30 RX ADMIN — NITROGLYCERIN 0.4 MG: 0.4 TABLET, ORALLY DISINTEGRATING SUBLINGUAL at 07:03

## 2019-03-30 RX ADMIN — IPRATROPIUM BROMIDE AND ALBUTEROL SULFATE 3 ML: .5; 3 SOLUTION RESPIRATORY (INHALATION) at 04:03

## 2019-03-30 RX ADMIN — LEVOFLOXACIN 750 MG: 750 INJECTION, SOLUTION INTRAVENOUS at 03:03

## 2019-03-30 RX ADMIN — TIOTROPIUM BROMIDE 18 MCG: 18 CAPSULE ORAL; RESPIRATORY (INHALATION) at 11:03

## 2019-03-30 RX ADMIN — QUETIAPINE FUMARATE 12.5 MG: 25 TABLET, FILM COATED ORAL at 04:03

## 2019-03-30 RX ADMIN — POTASSIUM CHLORIDE 60 MEQ: 20 TABLET, EXTENDED RELEASE ORAL at 11:03

## 2019-03-30 RX ADMIN — PIPERACILLIN AND TAZOBACTAM 4.5 G: 4; .5 INJECTION, POWDER, LYOPHILIZED, FOR SOLUTION INTRAVENOUS; PARENTERAL at 05:03

## 2019-03-30 RX ADMIN — IPRATROPIUM BROMIDE AND ALBUTEROL SULFATE 3 ML: .5; 3 SOLUTION RESPIRATORY (INHALATION) at 03:03

## 2019-03-30 RX ADMIN — QUETIAPINE FUMARATE 12.5 MG: 25 TABLET, FILM COATED ORAL at 08:03

## 2019-03-30 RX ADMIN — ATORVASTATIN CALCIUM 40 MG: 40 TABLET, FILM COATED ORAL at 08:03

## 2019-03-30 RX ADMIN — MORPHINE SULFATE 2 MG: 2 INJECTION, SOLUTION INTRAMUSCULAR; INTRAVENOUS at 08:03

## 2019-03-30 RX ADMIN — PIPERACILLIN AND TAZOBACTAM 4.5 G: 4; .5 INJECTION, POWDER, LYOPHILIZED, FOR SOLUTION INTRAVENOUS; PARENTERAL at 10:03

## 2019-03-30 RX ADMIN — ASPIRIN 81 MG 81 MG: 81 TABLET ORAL at 08:03

## 2019-03-30 RX ADMIN — QUETIAPINE 25 MG: 25 TABLET ORAL at 08:03

## 2019-03-30 RX ADMIN — FUROSEMIDE 40 MG: 10 INJECTION, SOLUTION INTRAMUSCULAR; INTRAVENOUS at 07:03

## 2019-03-30 NOTE — PLAN OF CARE
Problem: Adult Inpatient Plan of Care  Goal: Plan of Care Review  Outcome: Ongoing (interventions implemented as appropriate)  Pt on room air in no apparent distress.  Breathing tx. Given with good pt. Effort.  Will cont. To monitor.

## 2019-03-30 NOTE — PLAN OF CARE
Problem: Physical Therapy Goal  Goal: Physical Therapy Goal  Goals to be met by: 2019     Patient will increase functional independence with mobility by performin. Supine to sit with supervision  2. Sit to supine with supervision  3. Rolling to Left and Right with Modified [place].  4. Sit to stand transfer with Supervision  5. Gait  x 100 feet with Stand-by Assistance/supervision with or without AD   6. Lower extremity exercise program x10-15 reps with supervision     Outcome: Ongoing (interventions implemented as appropriate)  Progressing fairly. Pt limited by abdomen pain. Cont POC.

## 2019-03-30 NOTE — PLAN OF CARE
Called Dr. Lu in regards to pt c/o sob of breath. He gave  an order to give 80mg of lasix and to watch bp. Order placed. Will continue to monitor. Pt 100% on RA

## 2019-03-30 NOTE — PT/OT/SLP PROGRESS
Physical Therapy Treatment    Patient Name:  Micky Ochoa   MRN:  [id number]    Recommendations:     Discharge Recommendations:  ([facility] with Part B therapy services)   Discharge Equipment Recommendations: none   Barriers to discharge: None    Assessment:     Micky Ochoa is a 68 y.o. male admitted with a medical diagnosis of Chest pain.  He presents with the following impairments/functional limitations:  weakness, impaired self care skills, impaired balance, impaired endurance, impaired functional mobilty, gait instability, impaired cognition, decreased lower extremity function, impaired cardiopulmonary response to activity, impaired muscle length, decreased safety awareness, decreased ROM. Pt was able to sit at EOB ~8 minutes /c CGA-SBA. Pt limited by abdomen pain. PTA informed nurse Sera post tx session. Noted pt appeared to get anxious on occassional due to heavy breathing. SaO2 on room air %. Nurse aware.    Rehab Prognosis: Fair; patient would benefit from acute skilled PT services to address these deficits and reach maximum level of function.    Recent Surgery: Procedure(s) (LRB):  CATHETERIZATION, HEART, LEFT (Left)  Stent, Drug Eluting, Single Vessel, Coronary  Percutaneous coronary intervention (N/A)  ANGIOGRAM, CORONARY ARTERY (N/A)  Ultrasound-coronary (N/A) 3 Days Post-Op    Plan:     During this hospitalization, patient to be seen 5 x/week to address the identified rehab impairments via gait training, therapeutic activities, therapeutic exercises and progress toward the following goals:    · Plan of Care Expires:  04/29/19    Subjective     Chief Complaint: abdomen pain  Patient/Family Comments/goals: I want to leave. I have to lay down.   Pain/Comfort:  · Pain Rating 1: (pt did not rate pain)  · Location - Side 1: Bilateral  · Location - Orientation 1: generalized  · Location 1: abdomen  · Pain Addressed 1: Reposition, Nurse notified  · Pain Rating Post-Intervention 1: (pt did not rate  pain)      Objective:     Communicated with nurse Zamora prior to session.  Patient found supine /c HOB raised /c BUE restraints with bed alarm, blood pressure cuff, telemetry, restraints(ICU monitoring) upon PT entry to room.     General Precautions: Standard, fall, aspiration, nectar thick, respiratory   Orthopedic Precautions:N/A   Braces: N/A     Functional Mobility:  · Bed Mobility:     · Rolling Left:  minimum assistance  · Rolling Right: minimum assistance  · Scooting: minimum assistance  · Supine to Sit: minimum assistance  · Sit to Supine: minimum assistance  · Transfers:     · Sit to Stand:  minimum assistance with no AD  · Gait: side-stepped 2-3 ft towards HOB /c min A      AM-PAC 6 CLICK MOBILITY  Turning over in bed (including adjusting bedclothes, sheets and blankets)?: 3  Sitting down on and standing up from a chair with arms (e.g., wheelchair, bedside commode, etc.): 3  Moving from lying on back to sitting on the side of the bed?: 3  Moving to and from a bed to a chair (including a wheelchair)?: 3  Need to walk in hospital room?: 3  Climbing 3-5 steps with a railing?: 1  Basic Mobility Total Score: 16       Therapeutic Activities and Exercises:  Found pt on room air. Unsupported sitting balance at EOB ~8 min /c CGA-SBA to increase tolerance in upright position & sitting balance. Performed AAROM BLE's therex consist of AP and LAQ x10 reps ea. Pt /c post lean /c LAQ therex requiring CGA for balance. Performed activities noted above /c pt limiting self due to abdomen pain. Returned pt back to supine position /c pt positioning himself in sidelying to R side.    BP at rest /c pt in bed: 107/56,  bpm, SaO2 100%  BP sitting at EOB: 118/70,  bpm, SaO2 100%  BP at rest in bed towards end of session: 99/57,  bpm, SaO2 99%    Patient left right sidelying with call button in reach, bed alarm on, restraints reapplied at end of session and nurse notified..    GOALS:   Multidisciplinary Problems      Physical Therapy Goals        Problem: Physical Therapy Goal    Goal Priority Disciplines Outcome Goal Variances Interventions   Physical Therapy Goal     PT, PT/OT Ongoing (interventions implemented as appropriate)     Description:  Goals to be met by: 2019     Patient will increase functional independence with mobility by performin. Supine to sit with supervision  2. Sit to supine with supervision  3. Rolling to Left and Right with Modified [place].  4. Sit to stand transfer with Supervision  5. Gait  x 100 feet with Stand-by Assistance/supervision with or without AD   6. Lower extremity exercise program x10-15 reps with supervision                      Time Tracking:     PT Received On: 19  PT Start Time: 1312     PT Stop Time: 1329  PT Total Time (min): 17 min     Billable Minutes: Therapeutic Activity 17    Treatment Type: Treatment  PT/PTA: PTA     PTA Visit Number: 1     Concha Brown PTA  2019

## 2019-03-30 NOTE — PROGRESS NOTES
Treatment Plan Update    Afebrile overnight and on room air since initial visit yesterday.  Repeat chest x-ray with interval improvement in bilateral airspace opacities.      Extremely low suspicion for a superimposed infectious component at this time and recommend management to be focused on heart failure management and targeting euvolemia.  Would have low threshold to discontinue antibiotics as culture results unfold.  Will sign off at this time.    Armando Petersen MD, MSc  Pulmonary/Critical Care Fellow

## 2019-03-30 NOTE — PLAN OF CARE
Problem: Adult Inpatient Plan of Care  Goal: Plan of Care Review  Outcome: Ongoing (interventions implemented as appropriate)  Pt vs tachycardic and tachypneic . Afebrile.Pt has been sleeping most of the day. Restraints are still required because he sometimes forgets where he is and starts pulling at iv's and equipment. Pt had one bm today and only urinated once. Pt worked with pt for a short amount of time due to sob and feeling tired. Pt has a low appetite but swallows with no issue. Safety maintained.

## 2019-03-30 NOTE — PT/OT/SLP PROGRESS
Speech Language Pathology Treatment    Patient Name:  Micky Ochoa   MRN:  [id number]  Admitting Diagnosis: Chest pain    Recommendations:                 General Recommendations:  Dysphagia therapy--ongoing swallow assessment  Diet recommendations: Full liquids, Nectar Thick   Aspiration Precautions: 1 bite/sip at a time, Alternating bites/sips, Assistance with meals, Assistance with thickening liquids, Eliminate distractions, Feed only when awake/alert, Frequent oral care, HOB to 90 degrees, Meds crushed in puree, Monitor for s/s of aspiration, No straws, Remain upright 30 minutes post meal, Small bites/sips and Standard aspiration precautions   General Precautions: Standard, fall, aspiration, nectar thick, respiratory    Subjective     Pt lethargic this AM. RN reports no swallowing difficulties with current diet (full liquids/nectar thick liquids)    Pain/Comfort:  · Pain Rating 1: 0/10    Objective:     Has the patient been evaluated by SLP for swallowing?   Yes  Keep patient NPO? No   Current Respiratory Status: nasal cannula      Pt unable to demonstrate alertness adequate for PO trials of thin liquids this date. Pt consuming full liquid consistencies and nectar thick liquids without overt s/s of aspiration or airway reported or observed. ST discussed diet recs and SLP tx plan with RN at bedside.    Assessment:     Micky Ochoa is a 68 y.o. male is safe for continuation of FULL Liquids Diet with NECTAR thick liquid consistency, crushed PO meds in puree textures, slow rate of intake, and assist thickening all liquids. SLP will continue to follow.    Goals:   Multidisciplinary Problems     SLP Goals        Problem: SLP Goal    Goal Priority Disciplines Outcome   SLP Goal     SLP Ongoing (interventions implemented as appropriate)   Description:  Short Term Goals:  1. Pt will participate in BSS to determine least restrictive diet.-ongoing  2. Pt will tolerate full liquids PO diet with NECTAR thick liquids with no  overt s/s of aspiration.                 Plan:     · Patient to be seen:  3 x/week   · Plan of Care expires:  04/28/19  · Plan of Care reviewed with:  other (see comments)(RN)   · SLP Follow-Up:  Yes       Discharge recommendations:  other (see comments)(TBD)     Time Tracking:     SLP Treatment Date:   03/30/19  Speech Start Time:  1148  Speech Stop Time:  1200     Speech Total Time (min):  12 min    Billable Minutes: Treatment Swallowing Dysfunction 12 minutes    Whitney Andrews CCC-SLP  03/30/2019

## 2019-03-30 NOTE — PROGRESS NOTES
Seen this am  Patient is resting comfortably  No acute issues last night      Vitals:    03/30/19 0400 03/30/19 0430 03/30/19 0500 03/30/19 0530   BP: (!) 95/55 108/61 105/64 105/62   Pulse: 104 106 103 96   Resp: (!) 36 (!) 37 (!) 33 (!) 23   Temp: 98.8 °F (37.1 °C)      TempSrc: Oral      SpO2: 100% 100% 100% 100%   Weight:    60 kg (132 lb 4.4 oz)   Height:             LABS  CBC  Recent Labs   Lab 03/27/19 2106 03/28/19  0947 03/30/19  0431   WBC 14.83* 19.69* 11.99   RBC 3.22* 3.32* 3.43*   HGB 10.5* 10.8* 11.0*   HCT 33.5* 34.1* 35.6*    275 302   * 103* 104*   MCH 32.6* 32.5* 32.1*   MCHC 31.3* 31.7* 30.9*     BMP  Recent Labs   Lab 03/28/19 0149 03/29/19  0525 03/30/19  0431    138 136   K 3.3* 2.9* 2.9*   CO2 22* 24 23    99 100   BUN 18 17 13   CREATININE 1.0 1.2 1.1   * 121* 112*       POCT-Glucose  No results found for: POCTGLUCOSE    Recent Labs   Lab 03/27/19 2106 03/28/19 0149 03/29/19  0525 03/30/19  0431   CALCIUM 8.7 8.8 8.9 9.0   MG 2.0  --   --   --    PHOS 3.2  --   --   --      LFT  Recent Labs   Lab 03/27/19 2106 03/29/19  0525   PROT 7.4 7.9   ALBUMIN 3.2* 2.7*   BILITOT 1.1* 1.8*   AST 29 27   ALKPHOS 76 82   ALT 18 14     GFR     COAGS  No results for input(s): PT, INR, APTT in the last 168 hours.  CE  Recent Labs   Lab 03/27/19 2106 03/27/19  2221   TROPONINI 16.027* 15.439*     ABGs  No results for input(s): PH, PCO2, PO2, HCO3, POCSATURATED, BE in the last 24 hours.  BNP  Recent Labs   Lab 03/27/19  2106   BNP 2,283*         Echo     EF 25%   Severe LA   Normal RV function   PASP 25 mmHg       S/p LAD PCI  LVEDP > 25 mmHg        Imp:      CAD s/p LAD PCI for STEMI  ICM with EF 25% + elevated LVEDP  HTN  HLP  Tachycardia-SVT, atrial flutter/fib,   Acute respiratory failure  Leukocytosis  Hypokalemia  PTSD  Dementia     Plan:        Continue with medical therapy for CAD/ACS/CHF  Diuresis as tolerated  Abx for possible aspiration  pneumonia  Appreciate psychiatry's inputs

## 2019-03-31 PROCEDURE — 63600175 PHARM REV CODE 636 W HCPCS: Performed by: STUDENT IN AN ORGANIZED HEALTH CARE EDUCATION/TRAINING PROGRAM

## 2019-03-31 PROCEDURE — 25000003 PHARM REV CODE 250: Performed by: STUDENT IN AN ORGANIZED HEALTH CARE EDUCATION/TRAINING PROGRAM

## 2019-03-31 PROCEDURE — 99291 CRITICAL CARE FIRST HOUR: CPT | Mod: 25,,, | Performed by: INTERNAL MEDICINE

## 2019-03-31 PROCEDURE — 94640 AIRWAY INHALATION TREATMENT: CPT

## 2019-03-31 PROCEDURE — 20000000 HC ICU ROOM

## 2019-03-31 PROCEDURE — 93010 EKG 12-LEAD: ICD-10-PCS | Mod: ,,, | Performed by: STUDENT IN AN ORGANIZED HEALTH CARE EDUCATION/TRAINING PROGRAM

## 2019-03-31 PROCEDURE — 63600175 PHARM REV CODE 636 W HCPCS: Performed by: NURSE PRACTITIONER

## 2019-03-31 PROCEDURE — 99291 PR CRITICAL CARE, E/M 30-74 MINUTES: ICD-10-PCS | Mod: 25,,, | Performed by: INTERNAL MEDICINE

## 2019-03-31 PROCEDURE — 25000003 PHARM REV CODE 250: Performed by: NURSE PRACTITIONER

## 2019-03-31 PROCEDURE — 93010 ELECTROCARDIOGRAM REPORT: CPT | Mod: ,,, | Performed by: STUDENT IN AN ORGANIZED HEALTH CARE EDUCATION/TRAINING PROGRAM

## 2019-03-31 PROCEDURE — 25000003 PHARM REV CODE 250: Performed by: PSYCHIATRY & NEUROLOGY

## 2019-03-31 PROCEDURE — 25000242 PHARM REV CODE 250 ALT 637 W/ HCPCS: Performed by: STUDENT IN AN ORGANIZED HEALTH CARE EDUCATION/TRAINING PROGRAM

## 2019-03-31 PROCEDURE — 94761 N-INVAS EAR/PLS OXIMETRY MLT: CPT

## 2019-03-31 PROCEDURE — 93005 ELECTROCARDIOGRAM TRACING: CPT

## 2019-03-31 PROCEDURE — 92526 ORAL FUNCTION THERAPY: CPT

## 2019-03-31 RX ADMIN — VANCOMYCIN HYDROCHLORIDE 1000 MG: 1 INJECTION, POWDER, FOR SOLUTION INTRAVENOUS at 08:03

## 2019-03-31 RX ADMIN — IPRATROPIUM BROMIDE AND ALBUTEROL SULFATE 3 ML: .5; 3 SOLUTION RESPIRATORY (INHALATION) at 08:03

## 2019-03-31 RX ADMIN — QUETIAPINE 25 MG: 25 TABLET ORAL at 08:03

## 2019-03-31 RX ADMIN — TICAGRELOR 90 MG: 90 TABLET ORAL at 08:03

## 2019-03-31 RX ADMIN — QUETIAPINE FUMARATE 12.5 MG: 25 TABLET, FILM COATED ORAL at 04:03

## 2019-03-31 RX ADMIN — ATORVASTATIN CALCIUM 40 MG: 40 TABLET, FILM COATED ORAL at 09:03

## 2019-03-31 RX ADMIN — TICAGRELOR 90 MG: 90 TABLET ORAL at 09:03

## 2019-03-31 RX ADMIN — PIPERACILLIN AND TAZOBACTAM 4.5 G: 4; .5 INJECTION, POWDER, LYOPHILIZED, FOR SOLUTION INTRAVENOUS; PARENTERAL at 02:03

## 2019-03-31 RX ADMIN — TIOTROPIUM BROMIDE 18 MCG: 18 CAPSULE ORAL; RESPIRATORY (INHALATION) at 08:03

## 2019-03-31 RX ADMIN — IPRATROPIUM BROMIDE AND ALBUTEROL SULFATE 3 ML: .5; 3 SOLUTION RESPIRATORY (INHALATION) at 04:03

## 2019-03-31 RX ADMIN — PIPERACILLIN AND TAZOBACTAM 4.5 G: 4; .5 INJECTION, POWDER, LYOPHILIZED, FOR SOLUTION INTRAVENOUS; PARENTERAL at 06:03

## 2019-03-31 RX ADMIN — IPRATROPIUM BROMIDE AND ALBUTEROL SULFATE 3 ML: .5; 3 SOLUTION RESPIRATORY (INHALATION) at 11:03

## 2019-03-31 RX ADMIN — IPRATROPIUM BROMIDE AND ALBUTEROL SULFATE 3 ML: .5; 3 SOLUTION RESPIRATORY (INHALATION) at 03:03

## 2019-03-31 RX ADMIN — QUETIAPINE FUMARATE 12.5 MG: 25 TABLET, FILM COATED ORAL at 09:03

## 2019-03-31 RX ADMIN — LEVOFLOXACIN 750 MG: 750 INJECTION, SOLUTION INTRAVENOUS at 02:03

## 2019-03-31 RX ADMIN — PIPERACILLIN AND TAZOBACTAM 4.5 G: 4; .5 INJECTION, POWDER, LYOPHILIZED, FOR SOLUTION INTRAVENOUS; PARENTERAL at 10:03

## 2019-03-31 RX ADMIN — IPRATROPIUM BROMIDE AND ALBUTEROL SULFATE 3 ML: .5; 3 SOLUTION RESPIRATORY (INHALATION) at 07:03

## 2019-03-31 RX ADMIN — ASPIRIN 81 MG 81 MG: 81 TABLET ORAL at 09:03

## 2019-03-31 RX ADMIN — FLUTICASONE FUROATE AND VILANTEROL TRIFENATATE 1 PUFF: 100; 25 POWDER RESPIRATORY (INHALATION) at 08:03

## 2019-03-31 NOTE — PROGRESS NOTES
Seen this am  Patient c/o mild chest pain and dyspnea  SL NTG controlled the pain  IV lasix given for dyspnea        No complaints this am          Vitals:    03/31/19 1130 03/31/19 1153 03/31/19 1200 03/31/19 1230   BP: (!) 99/56  109/65    Pulse: 102 104 106    Resp: (!) 27 (!) 22 (!) 27    Temp:    98.5 °F (36.9 °C)   TempSrc:       SpO2: 98% 100% 98%    Weight:       Height:                 LABS  CBC  Recent Labs   Lab 03/27/19 2106 03/28/19  0947 03/30/19  0431   WBC 14.83* 19.69* 11.99   RBC 3.22* 3.32* 3.43*   HGB 10.5* 10.8* 11.0*   HCT 33.5* 34.1* 35.6*    275 302   * 103* 104*   MCH 32.6* 32.5* 32.1*   MCHC 31.3* 31.7* 30.9*     BMP  Recent Labs   Lab 03/28/19 0149 03/29/19  0525 03/30/19  0431    138 136   K 3.3* 2.9* 2.9*   CO2 22* 24 23    99 100   BUN 18 17 13   CREATININE 1.0 1.2 1.1   * 121* 112*       POCT-Glucose  No results found for: POCTGLUCOSE    Recent Labs   Lab 03/27/19 2106 03/28/19 0149 03/29/19  0525 03/30/19  0431   CALCIUM 8.7 8.8 8.9 9.0   MG 2.0  --   --   --    PHOS 3.2  --   --   --      LFT  Recent Labs   Lab 03/27/19 2106 03/29/19  0525   PROT 7.4 7.9   ALBUMIN 3.2* 2.7*   BILITOT 1.1* 1.8*   AST 29 27   ALKPHOS 76 82   ALT 18 14     GFR     COAGS  No results for input(s): PT, INR, APTT in the last 168 hours.  CE  Recent Labs   Lab 03/27/19 2106 03/27/19  2221   TROPONINI 16.027* 15.439*     ABGs  No results for input(s): PH, PCO2, PO2, HCO3, POCSATURATED, BE in the last 24 hours.  BNP  Recent Labs   Lab 03/27/19 2106   BNP 2,283*         Echo                 EF 25%              Severe LA              Normal RV function              PASP 25 mmHg                   S/p LAD PCI  LVEDP > 25 mmHg           Imp:        CAD s/p LAD PCI for STEMI  ICM with EF 25% + elevated LVEDP  HTN  HLP  Tachycardia-SVT, atrial flutter/fib,   Acute respiratory failure  Leukocytosis  Hypokalemia  PTSD  Dementia      Plan:           Continue with medical  therapy for CAD/ACS/CHF  Diuresis as tolerated  Abx for possible aspiration pneumonia  Appreciate psychiatry's inputs      Possible DC in am

## 2019-03-31 NOTE — PT/OT/SLP PROGRESS
Speech Language Pathology Treatment    Patient Name:  Micky Ochoa   MRN:  [id number]  Admitting Diagnosis: Chest pain    Recommendations:                 General Recommendations:  Dysphagia therapy--ongoing swallow assessment  Diet recommendations: Full liquids, Nectar Thick   Aspiration Precautions: 1 bite/sip at a time, Alternating bites/sips, Assistance with meals, Assistance with thickening liquids, Eliminate distractions, Feed only when awake/alert, Frequent oral care, HOB to 90 degrees, Meds crushed in puree, Monitor for s/s of aspiration, No straws, Remain upright 30 minutes post meal, Small bites/sips and Standard aspiration precautions   General Precautions: Standard, fall, aspiration, nectar thick, respiratory    Subjective     Pt continues to present with dcr ЮЛИЯ--sleeping all day per RN report. RN reports no swallowing difficulties with current diet (full liquids/nectar thick liquids).     Pain/Comfort:  · Pain Rating 1: 0/10    Objective:     Has the patient been evaluated by SLP for swallowing?   Yes  Keep patient NPO? No   Current Respiratory Status: nasal cannula      SLP repositioned pt to 90 degree angle in bed. Pt presenting with wet/gurgly vocal quality after consuming PO trials of thin liquids via straw and tsp. Pt tolerated nectar thick liquids and puree textures with no overt s/s of aspiration, at bedside. PO trials of soft solids deferred as pt presents with dcr ЮЛИЯ/participation.    Assessment:     Micky Ochoa is a 68 y.o. male continues to present with overt s/s of aspiration with THIN liquids (wet/gurgly vocal quality after the swallow). SLP recs: FULL Liquids Diet with NECTAR thick liquid consistency, crushed PO meds in puree textures, slow rate of intake, and assist thickening all liquids. SLP will continue to follow.     Goals:   Multidisciplinary Problems     SLP Goals        Problem: SLP Goal    Goal Priority Disciplines Outcome   SLP Goal     SLP Ongoing (interventions implemented as  appropriate)   Description:  Short Term Goals:  1. Pt will participate in BSS to determine least restrictive diet. --ongoing  2. Pt will tolerate full liquids PO diet with NECTAR thick liquids with no overt s/s of aspiration. --MET 3/31                 Plan:     · Patient to be seen:  3 x/week   · Plan of Care expires:  04/28/19  · Plan of Care reviewed with:  patient, other (see comments)(RN)   · SLP Follow-Up:  Yes       Discharge recommendations:  other (see comments)(TBD)     Time Tracking:     SLP Treatment Date:   03/31/19  Speech Start Time:  1248  Speech Stop Time:  1303     Speech Total Time (min):  15 min    Billable Minutes: Treatment Swallowing Dysfunction 15 minutes    Whitney Andrews CCC-SLP  03/31/2019

## 2019-03-31 NOTE — PLAN OF CARE
Problem: SLP Goal  Goal: SLP Goal  Short Term Goals:  1. Pt will participate in BSS to determine least restrictive diet. --ongoing  2. Pt will tolerate full liquids PO diet with NECTAR thick liquids with no overt s/s of aspiration. --MET 3/31       Outcome: Ongoing (interventions implemented as appropriate)  3/31:  Pt participated in ongoing clinical swallow eval this PM. Pt continues to present with overt s/s of aspiration with THIN liquids (wet/gurgly vocal quality after the swallow). However, pt tolerated nectar thick liquids and puree textures with no overt s/s of aspiration, at bedside. PO trials of soft solids deferred as pt presents with dcr ЮЛИЯ/participation. SLP recs: FULL Liquids Diet with NECTAR thick liquid consistency, crushed PO meds in puree textures, slow rate of intake, and assist thickening all liquids. SLP will continue to follow.   NICOLA Gaxiola. CCC-SLP  Speech-Language Pathologist

## 2019-03-31 NOTE — PLAN OF CARE
Problem: Adult Inpatient Plan of Care  Goal: Plan of Care Review  Outcome: Ongoing (interventions implemented as appropriate)  Mr Ochoa has remained chest pain free throughout the day, some sob noted with exertion, but pt did not c/o of any chest pain or sob. Sinus tach on monitor with elevated st. Vss. Will continue to monitor.

## 2019-04-01 LAB
CREAT SERPL-MCNC: 1.3 MG/DL (ref 0.5–1.4)
EST. GFR  (AFRICAN AMERICAN): >60 ML/MIN/1.73 M^2
EST. GFR  (NON AFRICAN AMERICAN): 56 ML/MIN/1.73 M^2

## 2019-04-01 PROCEDURE — 25000003 PHARM REV CODE 250: Performed by: STUDENT IN AN ORGANIZED HEALTH CARE EDUCATION/TRAINING PROGRAM

## 2019-04-01 PROCEDURE — 92526 ORAL FUNCTION THERAPY: CPT

## 2019-04-01 PROCEDURE — 25000242 PHARM REV CODE 250 ALT 637 W/ HCPCS: Performed by: STUDENT IN AN ORGANIZED HEALTH CARE EDUCATION/TRAINING PROGRAM

## 2019-04-01 PROCEDURE — 97110 THERAPEUTIC EXERCISES: CPT

## 2019-04-01 PROCEDURE — 94761 N-INVAS EAR/PLS OXIMETRY MLT: CPT

## 2019-04-01 PROCEDURE — 82565 ASSAY OF CREATININE: CPT

## 2019-04-01 PROCEDURE — 36415 COLL VENOUS BLD VENIPUNCTURE: CPT

## 2019-04-01 PROCEDURE — 63600175 PHARM REV CODE 636 W HCPCS: Performed by: NURSE PRACTITIONER

## 2019-04-01 PROCEDURE — 25000003 PHARM REV CODE 250: Performed by: PSYCHIATRY & NEUROLOGY

## 2019-04-01 PROCEDURE — 97116 GAIT TRAINING THERAPY: CPT

## 2019-04-01 PROCEDURE — 94640 AIRWAY INHALATION TREATMENT: CPT

## 2019-04-01 PROCEDURE — 97803 MED NUTRITION INDIV SUBSEQ: CPT

## 2019-04-01 PROCEDURE — 25000003 PHARM REV CODE 250: Performed by: NURSE PRACTITIONER

## 2019-04-01 PROCEDURE — 97535 SELF CARE MNGMENT TRAINING: CPT

## 2019-04-01 PROCEDURE — 20000000 HC ICU ROOM

## 2019-04-01 RX ORDER — ATORVASTATIN CALCIUM 40 MG/1
40 TABLET, FILM COATED ORAL DAILY
Qty: 90 TABLET | Refills: 3
Start: 2019-04-02 | End: 2019-04-06 | Stop reason: SDUPTHER

## 2019-04-01 RX ORDER — FUROSEMIDE 20 MG/1
20 TABLET ORAL DAILY
Qty: 30 TABLET | Refills: 11 | Status: SHIPPED | OUTPATIENT
Start: 2019-04-01 | End: 2019-04-06 | Stop reason: SDUPTHER

## 2019-04-01 RX ORDER — TIOTROPIUM BROMIDE 18 UG/1
1 CAPSULE ORAL; RESPIRATORY (INHALATION) DAILY
Qty: 30 CAPSULE | Refills: 0
Start: 2019-04-01 | End: 2019-04-06 | Stop reason: SDUPTHER

## 2019-04-01 RX ORDER — NITROGLYCERIN 0.4 MG/1
0.4 TABLET SUBLINGUAL EVERY 5 MIN PRN
Qty: 25 TABLET | Refills: 11
Start: 2019-04-01 | End: 2020-03-31

## 2019-04-01 RX ORDER — QUETIAPINE FUMARATE 25 MG/1
TABLET, FILM COATED ORAL
Qty: 30 TABLET | Refills: 11
Start: 2019-04-01

## 2019-04-01 RX ORDER — NAPROXEN SODIUM 220 MG/1
81 TABLET, FILM COATED ORAL DAILY
Refills: 0 | COMMUNITY
Start: 2019-04-02 | End: 2019-04-06 | Stop reason: SDUPTHER

## 2019-04-01 RX ORDER — FUROSEMIDE 40 MG/1
40 TABLET ORAL DAILY
Status: DISCONTINUED | OUTPATIENT
Start: 2019-04-01 | End: 2019-04-02

## 2019-04-01 RX ORDER — METOPROLOL SUCCINATE 25 MG/1
12.5 TABLET, EXTENDED RELEASE ORAL DAILY
Qty: 15 TABLET | Refills: 11 | Status: SHIPPED | OUTPATIENT
Start: 2019-04-01 | End: 2019-04-06 | Stop reason: SDUPTHER

## 2019-04-01 RX ORDER — QUETIAPINE FUMARATE 25 MG/1
25 TABLET, FILM COATED ORAL NIGHTLY
Qty: 30 TABLET | Refills: 11
Start: 2019-04-01 | End: 2020-03-31

## 2019-04-01 RX ORDER — LISINOPRIL 2.5 MG/1
2.5 TABLET ORAL DAILY
Qty: 90 TABLET | Refills: 3 | Status: SHIPPED | OUTPATIENT
Start: 2019-04-01 | End: 2019-04-02 | Stop reason: SDUPTHER

## 2019-04-01 RX ORDER — FLUTICASONE FUROATE AND VILANTEROL 100; 25 UG/1; UG/1
1 POWDER RESPIRATORY (INHALATION) DAILY
Qty: 60 EACH | Refills: 0
Start: 2019-04-01

## 2019-04-01 RX ADMIN — ATORVASTATIN CALCIUM 40 MG: 40 TABLET, FILM COATED ORAL at 08:04

## 2019-04-01 RX ADMIN — IPRATROPIUM BROMIDE AND ALBUTEROL SULFATE 3 ML: .5; 3 SOLUTION RESPIRATORY (INHALATION) at 03:04

## 2019-04-01 RX ADMIN — QUETIAPINE FUMARATE 12.5 MG: 25 TABLET, FILM COATED ORAL at 05:04

## 2019-04-01 RX ADMIN — PIPERACILLIN AND TAZOBACTAM 4.5 G: 4; .5 INJECTION, POWDER, LYOPHILIZED, FOR SOLUTION INTRAVENOUS; PARENTERAL at 06:04

## 2019-04-01 RX ADMIN — IPRATROPIUM BROMIDE AND ALBUTEROL SULFATE 3 ML: .5; 3 SOLUTION RESPIRATORY (INHALATION) at 07:04

## 2019-04-01 RX ADMIN — FUROSEMIDE 40 MG: 40 TABLET ORAL at 10:04

## 2019-04-01 RX ADMIN — TIOTROPIUM BROMIDE 18 MCG: 18 CAPSULE ORAL; RESPIRATORY (INHALATION) at 07:04

## 2019-04-01 RX ADMIN — TICAGRELOR 90 MG: 90 TABLET ORAL at 08:04

## 2019-04-01 RX ADMIN — IPRATROPIUM BROMIDE AND ALBUTEROL SULFATE 3 ML: .5; 3 SOLUTION RESPIRATORY (INHALATION) at 11:04

## 2019-04-01 RX ADMIN — ASPIRIN 81 MG 81 MG: 81 TABLET ORAL at 08:04

## 2019-04-01 RX ADMIN — QUETIAPINE 25 MG: 25 TABLET ORAL at 08:04

## 2019-04-01 RX ADMIN — ACETAMINOPHEN 650 MG: 325 TABLET ORAL at 03:04

## 2019-04-01 RX ADMIN — QUETIAPINE FUMARATE 12.5 MG: 25 TABLET, FILM COATED ORAL at 08:04

## 2019-04-01 RX ADMIN — FLUTICASONE FUROATE AND VILANTEROL TRIFENATATE 1 PUFF: 100; 25 POWDER RESPIRATORY (INHALATION) at 07:04

## 2019-04-01 NOTE — ASSESSMENT & PLAN NOTE
-presented with chest pain and JERMAINE to anterior leads  -emergent LHC with LAD CHUN x 3; repeat EKG this AM with continued slight improvement in JERMAINE    -continue GDMT with DAPT and statin therapy; will attempt to initiate low dose BB and eventually low dose ACEI

## 2019-04-01 NOTE — ASSESSMENT & PLAN NOTE
-WBCs 14K upon admission with trend up to 19K and trend down  -afebrile   -blood cultures with NGTD   -IV antibiotics de escalated bx

## 2019-04-01 NOTE — CARE UPDATE
[facility]    FACILITY TRANSFER ORDERS      Patient Name: Micky Ochoa  YOB: 1950    PCP: Higinio Brush MD   PCP Address: [address] [place], [place] [address] [address] / [place]  PCP Phone Number: [phone]  PCP Fax: [phone]    Encounter Date: 04/01/2019    Admit to: [facility]     Vital Signs:  Routine    Diagnoses:   Active Hospital Problems    Diagnosis  POA    *STEMI (ST elevation myocardial infarction) [I21.3]  Yes    Acute combined systolic and diastolic heart failure [I50.41]  Yes    Coronary artery disease involving native coronary artery [I25.10]  Yes    Tachycardia [R00.0]  Yes    Acute respiratory failure [J96.00]  Yes     Nutrition Problem  Inadequate oral intake    Related to (etiology):   Recent STEMI with need for BI-pap  Previous Medical History of Dysphagia    Signs and Symptoms (as evidenced by):   NPO status     Interventions:  Collaboration with other providers     Nutrition Diagnosis Status:   Improving          Hypokalemia [E87.6]  Yes    Leukocytosis [D72.829]  Yes    Chest pain [R07.9]  Yes      Resolved Hospital Problems   No resolved problems to display.       Allergies:  Review of patient's allergies indicates:   Allergen Reactions    Codeine        Diet: cardiac diet    Activities: Activity as tolerated     CONSULTS:    Physical Therapy to evaluate and treat. , Occupational Therapy to evaluate and treat. and Speech Therapy to evaluate and treat for Swallowing.      Medications: Review discharge medications with patient and family and provide education.      Current Discharge Medication List      START taking these medications    Details   aspirin 81 MG Chew Take 1 tablet (81 mg total) by mouth once daily.  Refills: 0      atorvastatin (LIPITOR) 40 MG tablet Take 1 tablet (40 mg total) by mouth once daily.  Qty: 90 tablet, Refills: 3      fluticasone-vilanterol (BREO) 100-25 mcg/dose diskus inhaler Inhale 1 puff into the lungs once  daily. Controller  Qty: 60 each, Refills: 0      furosemide (LASIX) 20 MG tablet Take 1 tablet (20 mg total) by mouth once daily.  Qty: 30 tablet, Refills: 11      lisinopril (PRINIVIL,ZESTRIL) 2.5 MG tablet Take 1 tablet (2.5 mg total) by mouth once daily.  Qty: 90 tablet, Refills: 3      metoprolol succinate (TOPROL-XL) 25 MG 24 hr tablet Take 0.5 tablets (12.5 mg total) by mouth once daily.  Qty: 15 tablet, Refills: 11      nitroGLYCERIN (NITROSTAT) 0.4 MG SL tablet Place 1 tablet (0.4 mg total) under the tongue every 5 (five) minutes as needed for Chest pain (If chest pain is unrelieved by 3 doses, call MD).  Qty: 25 tablet, Refills: 11      !! QUEtiapine (SEROQUEL) 25 MG Tab 12.5mg po daily  Qty: 30 tablet, Refills: 11      !! QUEtiapine (SEROQUEL) 25 MG Tab 12.52mg po before dinner  Qty: 30 tablet, Refills: 11      !! QUEtiapine (SEROQUEL) 25 MG Tab Take 1 tablet (25 mg total) by mouth every evening.  Qty: 30 tablet, Refills: 11      ticagrelor (BRILINTA) 90 mg tablet Take 1 tablet (90 mg total) by mouth 2 (two) times daily.  Qty: 60 tablet, Refills: 11      tiotropium (SPIRIVA) 18 mcg inhalation capsule Inhale 1 capsule (18 mcg total) into the lungs once daily. Controller  Qty: 30 capsule, Refills: 0       !! - Potential duplicate medications found. Please discuss with provider.               _________________________________  HUSAM West, MYNOR  04/01/2019

## 2019-04-01 NOTE — PLAN OF CARE
"Pt got up to bedside commode, had BM but did not void. When asked, pt responded "I did." Pt was bladder scanned, 188mL in bladder noted. Dr. Suazo was notified, states pt needs to have more PO intake. Will continue to monitor.  "

## 2019-04-01 NOTE — SUBJECTIVE & OBJECTIVE
ROS  Objective:     Vital Signs (Most Recent):  Temp: 98.5 °F (36.9 °C) (04/01/19 1130)  Pulse: 97 (04/01/19 1532)  Resp: (!) 26 (04/01/19 1532)  BP: 118/69 (04/01/19 1500)  SpO2: 98 % (04/01/19 1532) Vital Signs (24h Range):  Temp:  [98.2 °F (36.8 °C)-98.5 °F (36.9 °C)] 98.5 °F (36.9 °C)  Pulse:  [] 97  Resp:  [20-78] 26  SpO2:  [96 %-100 %] 98 %  BP: ()/(51-78) 118/69     Weight: 59.2 kg (130 lb 8.2 oz)  Body mass index is 19.27 kg/m².     SpO2: 98 %  O2 Device (Oxygen Therapy): room air      Intake/Output Summary (Last 24 hours) at 4/1/2019 1546  Last data filed at 4/1/2019 0600  Gross per 24 hour   Intake 350 ml   Output 500 ml   Net -150 ml       Lines/Drains/Airways     Peripheral Intravenous Line                 Peripheral IV - Single Lumen 03/29/19 2155 Left Forearm 2 days                Physical Exam   Constitutional: He has a sickly appearance. He appears ill.   Cardiovascular: Normal rate and regular rhythm. Exam reveals no gallop.   No murmur heard.  Pulmonary/Chest: Effort normal. No tachypnea. He has decreased breath sounds.   Abdominal: Bowel sounds are normal. He exhibits no distension. There is no tenderness.   Neurological:   Eyes open spontaneously but pleasantly confused at times    Skin: Skin is warm and dry.       Significant Imaging:     TTE 3/28/2019    · Normal right ventricular systolic function.  · Severely decreased left ventricular systolic function. The estimated ejection fraction is 25%  · Left ventricular diastolic dysfunction.  · Severe left atrial enlargement.  · Normal central venous pressure (3 mm Hg).  · The estimated PA systolic pressure is 25 mm Hg

## 2019-04-01 NOTE — PT/OT/SLP PROGRESS
"Speech Language Pathology Treatment    Patient Name:  Micky Ochoa   MRN:  [id number]  Admitting Diagnosis: STEMI (ST elevation myocardial infarction)    Recommendations:                 General Recommendations:  Dysphagia therapy  Diet recommendations:  Mechanical soft, Liquid Diet Level: Nectar Thick   Aspiration Precautions: 1 bite/sip at a time, Alternating bites/sips, Assistance with meals and Assistance with thickening liquids, Avoid talking while eating, Check for pocketing/oral residue, Double swallow with each bite/sip, Eliminate distractions, Feed only when awake/alert, HOB to 90 degrees, Meds crushed in puree, Monitor for s/s of aspiration, No straws, Remain upright 30 minutes post meal, Small bites/sips and Standard aspiration precautions   General Precautions: Standard, aspiration, fall, nectar thick  Communication strategies:  provide increased time to answer    Subjective     Pt seen at the bedside for ongoing swallow assessment. SLP checked w/ RNParis, prior to visit. Pt resting in bed but awoke to verbal stimuli. Pt agreeable to PO trials.   Patient goals: Pt stated, "I better stop eating now."     Pain/Comfort:  · Pain Rating 1: 0/10    Objective:     Has the patient been evaluated by SLP for swallowing?   Yes  Keep patient NPO? No   Current Respiratory Status: room air      Pt repositioned for optimal swallow safety given RN and SLP assist. Soft restraints observed on wrists. HOB elevated to 90*. He consumed tsp sips nectar-thick tea x6, cup sips nectar-thick tea x4, and tsp bites diced peaches (drained) x8. No overt s/s of aspiration evident across trials. Mildly delayed swallow initiation upon SLP palpation. Good mastication for soft solids. Voice remained clear/dry s/p trials. Pt prompted to produce double swallow intermittently to decrease any pharyngeal residue. Swallowing precautions reviewed with pt. Upon leaving room, pt removed cardiac leads, RN notified.      Assessment:     Micky" Don is a 68 y.o. male with an SLP diagnosis of Dysphagia.  He presents with improving s/s of aspiration. Diet to be UPGRADED to Mech Soft/NECTAR-thick liquids, given assistance with meals and thickening liquids. Pt to implement double swallow and follow all universal swallowing precautions. SLP reviewed recs w/ pt and EDINSON [place].    Goals:   Multidisciplinary Problems     SLP Goals        Problem: SLP Goal    Goal Priority Disciplines Outcome   SLP Goal     SLP Ongoing (interventions implemented as appropriate)   Description:  Short Term Goals:  1. Pt will participate in BSS to determine least restrictive diet. --ongoing  2. Pt will tolerate full liquids PO diet with NECTAR thick liquids with no overt s/s of aspiration. --MET 3/31  3. Pt will consume mech soft/NECTAR thick liquids without any overt s/s of aspiration.                           Plan:     · Patient to be seen:  3 x/week   · Plan of Care expires:  04/28/19  · Plan of Care reviewed with:  patient, other (see comments)(EDINSON [place])   · SLP Follow-Up:  Yes       Discharge recommendations:  other (see comments)(TBD)   Barriers to Discharge:  None    Time Tracking:     SLP Treatment Date:   04/01/19  Speech Start Time:  1317  Speech Stop Time:  1338     Speech Total Time (min):  21 min    Billable Minutes: Treatment Swallowing Dysfunction 10 and Seld Care/Home Management Training 11    Kelley Diaz CCC-SLP  04/01/2019

## 2019-04-01 NOTE — PLAN OF CARE
Problem: Adult Inpatient Plan of Care  Goal: Plan of Care Review  Outcome: Ongoing (interventions implemented as appropriate)  VSS throughout the day. Still in restraints and restless. Transfer order to floor placed. Awaiting bed.

## 2019-04-01 NOTE — ASSESSMENT & PLAN NOTE
-echo with severely depressed LV function with EF 25% and diastolic dysfunction  -acute pulmonary edema post procedure with good response with IV Lasix  -chest pain and SOB over the weekend concerning for recurrent volume overload  -given IV Lasix with decent response; respiratory status improved; negative 2 liters currently  -will give dose of oral Lasix today and place on low dose oral Lasix in attempts to prevent ADHF if BP tolerates; needs close monitoring of I&Os given high risk of recurrent ADHF as well as overdiuresis given minimal po intake in setting of dementia   -strict I&Os and daily weights

## 2019-04-01 NOTE — ASSESSMENT & PLAN NOTE
-tachypnea with desaturation post procedure  -LVEDP 30mmHG; CXR with evidence of pulmonary vascular congestion/interstitial edema bilaterally.  -IV diuresis with IV Lasix BID with good response; weaned off BiPap and treated initially for concern for PNA; cultures negative and abx de escalated per Pulmonary recommendations '  -given oral Lasix today; will plan to discharge on low dose Lasix; decision for low dose due to dementia and minimal po intake and concern for overdiuresis

## 2019-04-01 NOTE — PLAN OF CARE
Problem: Oral Intake Inadequate  Goal: Improved Oral Intake  Outcome: Ongoing (interventions implemented as appropriate)  Recommendation:   1. Encourage intake at meals as tolerated.   2. Await further diet recs from ST.   3. Add Boost Plus bid.     Goals:   Pt to consume at least 50-75% intake at meals  Nutrition Goal Status: new  Communication of RD Recs: reviewed with RN(Tere)

## 2019-04-01 NOTE — PLAN OF CARE
sent updated clinicals to Avril at [facility].     04/01/19 1050   Post-Acute Status   Post-Acute Authorization Placement   Post-Acute Placement Status Additional Clinical Requested

## 2019-04-01 NOTE — NURSING
Bedside swallow study done by speech therapy and requested a mechanical soft diet with nectar thick liquids. Order placed.

## 2019-04-01 NOTE — PLAN OF CARE
Problem: Physical Therapy Goal  Goal: Physical Therapy Goal  Goals to be met by: 2019     Patient will increase functional independence with mobility by performin. Supine to sit with supervision  2. Sit to supine with supervision  3. Rolling to Left and Right with Modified [place].  4. Sit to stand transfer with Supervision  5. Gait  x 100 feet with Stand-by Assistance/supervision with or without AD   6. Lower extremity exercise program x10-15 reps with supervision     Outcome: Ongoing (interventions implemented as appropriate)  Pt progressing toward goals; initial /66 sitting  SpO2 97%; with ex  /61; during amb 120ft with HHA-CG/Micheal  SpO2 100%; post amb 1-2 minutes /69  SpO2 99%; cont with POC.

## 2019-04-01 NOTE — CARE UPDATE
[facility]    FACILITY TRANSFER ORDERS      Patient Name: Micky Ochoa  YOB: 1950    PCP: Higinio Brush MD   PCP Address: [address] [place], [place] [address] [address] / [place]  PCP Phone Number: [phone]  PCP Fax: [phone]    Encounter Date: 04/01/2019    Admit to: [facility]     Vital Signs:  Routine    Diagnoses:   Active Hospital Problems    Diagnosis  POA    *STEMI (ST elevation myocardial infarction) [I21.3]  Yes    Acute combined systolic and diastolic heart failure [I50.41]  Yes    Coronary artery disease involving native coronary artery [I25.10]  Yes    Tachycardia [R00.0]  Yes    Acute respiratory failure [J96.00]  Yes     Nutrition Problem  Inadequate oral intake    Related to (etiology):   Recent STEMI with need for BI-pap  Previous Medical History of Dysphagia    Signs and Symptoms (as evidenced by):   NPO status     Interventions:  Collaboration with other providers     Nutrition Diagnosis Status:   New          Hypokalemia [E87.6]  Yes    Leukocytosis [D72.829]  Yes    Chest pain [R07.9]  Yes      Resolved Hospital Problems   No resolved problems to display.       Allergies:  Review of patient's allergies indicates:   Allergen Reactions    Codeine        Diet: cardiac diet    Activities: Activity as tolerated    CONSULTS:    Physical Therapy to evaluate and treat. , Occupational Therapy to evaluate and treat. and Speech Therapy to evaluate and treat for Swallowing.      Medications: Review discharge medications with patient and family and provide education.      Current Discharge Medication List      START taking these medications    Details   aspirin 81 MG Chew Take 1 tablet (81 mg total) by mouth once daily.  Refills: 0      atorvastatin (LIPITOR) 40 MG tablet Take 1 tablet (40 mg total) by mouth once daily.  Qty: 90 tablet, Refills: 3      fluticasone-vilanterol (BREO) 100-25 mcg/dose diskus inhaler Inhale 1 puff into the lungs once daily.  Controller  Qty: 60 each, Refills: 0      nitroGLYCERIN (NITROSTAT) 0.4 MG SL tablet Place 1 tablet (0.4 mg total) under the tongue every 5 (five) minutes as needed for Chest pain (If chest pain is unrelieved by 3 doses, call MD).  Qty: 25 tablet, Refills: 11      !! QUEtiapine (SEROQUEL) 25 MG Tab 12.5mg po daily  Qty: 30 tablet, Refills: 11      !! QUEtiapine (SEROQUEL) 25 MG Tab 12.52mg po before dinner  Qty: 30 tablet, Refills: 11      !! QUEtiapine (SEROQUEL) 25 MG Tab Take 1 tablet (25 mg total) by mouth every evening.  Qty: 30 tablet, Refills: 11      ticagrelor (BRILINTA) 90 mg tablet Take 1 tablet (90 mg total) by mouth 2 (two) times daily.  Qty: 60 tablet, Refills: 11      tiotropium (SPIRIVA) 18 mcg inhalation capsule Inhale 1 capsule (18 mcg total) into the lungs once daily. Controller  Qty: 30 capsule, Refills: 0       !! - Potential duplicate medications found. Please discuss with provider.               _________________________________  HUSAM West, ANP  04/01/2019

## 2019-04-01 NOTE — PT/OT/SLP PROGRESS
Physical Therapy Treatment    Patient Name:  Micky Ochoa   MRN:  [id number]    Recommendations:     Discharge Recommendations:  (back to NH with Part B therapy)   Discharge Equipment Recommendations: none   Barriers to discharge: None    Assessment:     Micky Ochoa is a 68 y.o. male admitted with a medical diagnosis of STEMI (ST elevation myocardial infarction).  He presents with the following impairments/functional limitations:  weakness, impaired endurance, gait instability, impaired functional mobilty, impaired self care skills, impaired balance, impaired cardiopulmonary response to activity, decreased safety awareness, decreased ROM, impaired muscle length, impaired cognition, decreased lower extremity function Pt progressing toward goals; mild to mod increased RR with activity which resolves with pursed lip breathing and rest.    Rehab Prognosis: Fair+ to good; patient would benefit from acute skilled PT services to address these deficits and reach maximum level of function.    Recent Surgery: Procedure(s) (LRB):  CATHETERIZATION, HEART, LEFT (Left)  Stent, Drug Eluting, Single Vessel, Coronary  Percutaneous coronary intervention (N/A)  ANGIOGRAM, CORONARY ARTERY (N/A)  Ultrasound-coronary (N/A) 5 Days Post-Op    Plan:     During this hospitalization, patient to be seen 5 x/week to address the identified rehab impairments via gait training, therapeutic activities, therapeutic exercises, neuromuscular re-education and progress toward the following goals:    · Plan of Care Expires:  04/29/19    Subjective     Pain/Comfort:  · Pain Rating 1: 0/10  · Pain Addressed 1: Nurse notified, Distraction, Cessation of Activity(pt did not rate, but c/o having a headache during the session)      Objective:     Communicated with nurse prior to session.  Patient found with bed alarm, restraints(ICU monitoring) upon PT entry to room.     General Precautions: Standard, aspiration, fall, nectar thick   Orthopedic Precautions:N/A    Braces: N/A     Functional Mobility:  · Bed Mobility:     · Rolling Left: supervision  · Scooting: stand by assistance  · Supine to Sit: stand by assistance  · Sit to Supine: stand by assistance  · Transfers:     · Sit to Stand:  stand by assistance and contact guard assistance with hand-held assist  · Gait: pt amb 120ft with HHA-CG/Micheal; pt with minimal gait instability, short step length, slow philip      AM-PAC 6 CLICK MOBILITY  Turning over in bed (including adjusting bedclothes, sheets and blankets)?: 3  Sitting down on and standing up from a chair with arms (e.g., wheelchair, bedside commode, etc.): 3  Moving from lying on back to sitting on the side of the bed?: 3  Moving to and from a bed to a chair (including a wheelchair)?: 3  Need to walk in hospital room?: 3  Climbing 3-5 steps with a railing?: 2  Basic Mobility Total Score: 17       Therapeutic Activities and Exercises:   patient performed seated exercise BLEs 10-15 reps BLEs -APs, ankle circles, hip abd/add, marches; 8 -10 reps BUE ex forward punches, shld elevation, horiz abd/add; pt performed sit to stand and amb as described above; pursed lip breathing to slow respirations from 40 to 23-24; back to bed with HOB elevated and restraints re-applied.    Patient left HOB elevated with all lines intact, call button in reach, bed alarm on and nurse notified..    GOALS:   Multidisciplinary Problems     Physical Therapy Goals        Problem: Physical Therapy Goal    Goal Priority Disciplines Outcome Goal Variances Interventions   Physical Therapy Goal     PT, PT/OT Ongoing (interventions implemented as appropriate)     Description:  Goals to be met by: 2019     Patient will increase functional independence with mobility by performin. Supine to sit with supervision  2. Sit to supine with supervision  3. Rolling to Left and Right with Modified [place].  4. Sit to stand transfer with Supervision  5. Gait  x 100 feet with Stand-by  Assistance/supervision with or without AD   6. Lower extremity exercise program x10-15 reps with supervision                      Time Tracking:     PT Received On: 04/01/19  PT Start Time: 1433     PT Stop Time: 1503  PT Total Time (min): 30 min     Billable Minutes: Gait Training 12 minutes and Therapeutic Exercise 18 minutes    Treatment Type: Treatment  PT/PTA: PT     PTA Visit Number: 0     Jadyn Torres, PT  04/01/2019

## 2019-04-01 NOTE — ASSESSMENT & PLAN NOTE
-related to ischemic etiology and JERMAINE  -s/p LAD CHUN   -episode over the weekend felt to be related to volume overload; chest pain free this AM

## 2019-04-01 NOTE — ASSESSMENT & PLAN NOTE
-presented with STEMI and elevated troponin  -emergent LHC with LAD CHUN x 3  -continue ASA, statin and Brilinta; BB and ACEI held due to marginal BP; BP improved; will attempt to resume low dose BB if BP tolerates and eventually would like to initiate low dose ACEI if BP tolerates   -echocardiogram  with severely depressed LVEF

## 2019-04-01 NOTE — PLAN OF CARE
Problem: SLP Goal  Goal: SLP Goal  Short Term Goals:  1. Pt will participate in BSS to determine least restrictive diet. --ongoing  2. Pt will tolerate full liquids PO diet with NECTAR thick liquids with no overt s/s of aspiration. --MET 3/31  3. Pt will consume mech soft/NECTAR thick liquids without any overt s/s of aspiration.         Outcome: Ongoing (interventions implemented as appropriate)  4/1: Ongoing swallow assessment completed this PM. Diet to be UPGRADED to Mech Soft/Nectar-thick liquids. Assistance required with intake. Crush PO meds in puree textures, double swallow, slow rate of intake, and assist thickening all liquids. SLP will continue to follow.

## 2019-04-01 NOTE — PROGRESS NOTES
[facility]  Cardiology  Progress Note    Patient Name: Micky Ochoa  MRN: [id number]  Admission Date: 3/27/2019  Hospital Length of Stay: 5 days  Code Status: No Order   Attending Physician: Abdiel Perez MD   Primary Care Physician: Higinio Brush MD  Expected Discharge Date:   Principal Problem:STEMI (ST elevation myocardial infarction)    Subjective:     Hospital Course:   3/27/2019 Presented to the ER with complaints of chest pain. Initial troponin 16.027 with trend down to 15.439. EKG with JERMAINE to anterior leads. BMP WNL. CBC with WBCs 14K and temp 100.4- cultures ordered. Given IV Vanc and Zosyn. Taken to cath lab for emergent procedure  3/28/2019 Emergent LHC yesterday for JERMAINE with occluded LAD treated with LAD CHUN x 3 and elevated LVEDP. Acute distress with hypoxia and tahcypnea noted post procedure. Given IV Lasix and placed on BiPap admitted to ICU. HR elevated in the 120s ST since admission with stable BP. Tachypnea persists with stable sats on BiPap. Repeat IV Lasix ordered. ABG done with pH 7.51 pCO2 30.3 pO2 116 HCO3 24.3 BiPap adjusted from 12/6 to 15/6. BMP with K+ 3.3-replacement ordered. Creatinine 1.0. CBC with WBC 19K-blood cultures pending. Continue GDMT with IV Lasix and BiPap for volume removal. Echocardiogram today with pending results   3/29/2019 Agitated overnight with initiation of Precedex per eICU. Psych consulted with recommendations noted. Off Bipap with improvement in tachypnea noted today. 2.1 liters out overnight and negative 1.6 liters since admission. HR trended down to 90s from 110s-120s. Marginal BP yesterday with trend down to 80s-90s overnight with MAPs [id number] discontinue Toprol XL for now. K+ 2.9 with IV replacement ordered-NPO until seen by speech. Blood cultures with NGTD. Sputum culture pending. Remains on IV abx and appreciate Pulmonary recs  3/30/2019 No acute issues last night. SBP marginal. Remains off ACEI and BB  3/31/2019Seen this AM.  Complained of mild chest pain and dyspnea. Pain improved with SL NTG controlled the pain and IV lasix given for dyspnea. Appears better currently. PT and OT on board and at baseline functional status per notes. Hopeful to discharge back to [facility]  4/1/2019 Resting comfortably this AM. HR stable. SBP 110s-130s with BP down to 88/51 this AM. Blood cultures with NGTD. Sputum culture not collected due to no sputum production. Pulmonary note reviewed from 3/30 with low suspicion of PNA and recommendation for de escalation of antibiotics. Will give dose of oral Lasix today. Apprehensive to discharge home on Lasix due to minimal po intake and baseline dementia. Continue medical management for CAD and CHF although limited due to marginal BPs. Plan to discharge to [facility] today. Medically stable for discharge back to [facility] however they require patient to be transferred out of ICU to floor prior to accepting patient back to facility. HR and BP stable. Transfer orders written                    ROS  Objective:     Vital Signs (Most Recent):  Temp: 98.5 °F (36.9 °C) (04/01/19 1130)  Pulse: 97 (04/01/19 1532)  Resp: (!) 26 (04/01/19 1532)  BP: 118/69 (04/01/19 1500)  SpO2: 98 % (04/01/19 1532) Vital Signs (24h Range):  Temp:  [98.2 °F (36.8 °C)-98.5 °F (36.9 °C)] 98.5 °F (36.9 °C)  Pulse:  [] 97  Resp:  [20-78] 26  SpO2:  [96 %-100 %] 98 %  BP: ()/(51-78) 118/69     Weight: 59.2 kg (130 lb 8.2 oz)  Body mass index is 19.27 kg/m².     SpO2: 98 %  O2 Device (Oxygen Therapy): room air      Intake/Output Summary (Last 24 hours) at 4/1/2019 1546  Last data filed at 4/1/2019 0600  Gross per 24 hour   Intake 350 ml   Output 500 ml   Net -150 ml       Lines/Drains/Airways     Peripheral Intravenous Line                 Peripheral IV - Single Lumen 03/29/19 2155 Left Forearm 2 days                Physical Exam   Constitutional: He has a sickly appearance. He appears ill.    Cardiovascular: Normal rate and regular rhythm. Exam reveals no gallop.   No murmur heard.  Pulmonary/Chest: Effort normal. No tachypnea. He has decreased breath sounds.   Abdominal: Bowel sounds are normal. He exhibits no distension. There is no tenderness.   Neurological:   Eyes open spontaneously but pleasantly confused at times    Skin: Skin is warm and dry.       Significant Imaging:     TTE 3/28/2019    · Normal right ventricular systolic function.  · Severely decreased left ventricular systolic function. The estimated ejection fraction is 25%  · Left ventricular diastolic dysfunction.  · Severe left atrial enlargement.  · Normal central venous pressure (3 mm Hg).  · The estimated PA systolic pressure is 25 mm Hg    Assessment and Plan:     Brief HPI: Seen this morning on AM NP rounds while resting in bed. Alert and denies any complaints and appears pleasantly confused. Updated daughter-Yudi on POC as detailed below and verbalized understanding and agrees with POC. Discussed overall poor prognosis given severely depressed LVEF as well as dementia    * STEMI (ST elevation myocardial infarction)  -presented with chest pain and JERMAINE to anterior leads  -emergent LHC with LAD CHUN x 3; repeat EKG this AM with continued slight improvement in JERMAINE    -continue GDMT with DAPT and statin therapy; will attempt to initiate low dose BB and eventually low dose ACEI    Acute combined systolic and diastolic heart failure  -echo with severely depressed LV function with EF 25% and diastolic dysfunction  -acute pulmonary edema post procedure with good response with IV Lasix  -chest pain and SOB over the weekend concerning for recurrent volume overload  -given IV Lasix with decent response; respiratory status improved; negative 2 liters currently  -will give dose of oral Lasix today and place on low dose oral Lasix in attempts to prevent ADHF if BP tolerates; needs close monitoring of I&Os given high risk of recurrent ADHF as  well as overdiuresis given minimal po intake in setting of dementia   -strict I&Os and daily weights     Leukocytosis  -WBCs 14K upon admission with trend up to 19K and trend down  -afebrile   -blood cultures with NGTD   -IV antibiotics de escalated bx      Hypokalemia  -resolved  -related to IV diuresis and minimal po intake     Acute respiratory failure  -tachypnea with desaturation post procedure  -LVEDP 30mmHG; CXR with evidence of pulmonary vascular congestion/interstitial edema bilaterally.  -IV diuresis with IV Lasix BID with good response; weaned off BiPap and treated initially for concern for PNA; cultures negative and abx de escalated per Pulmonary recommendations '  -given oral Lasix today; will plan to discharge on low dose Lasix; decision for low dose due to dementia and minimal po intake and concern for overdiuresis     Tachycardia  -improved  -related to stress response    Coronary artery disease involving native coronary artery  -presented with STEMI and elevated troponin  -emergent LHC with LAD CHUN x 3  -continue ASA, statin and Brilinta; BB and ACEI held due to marginal BP; BP improved; will attempt to resume low dose BB if BP tolerates and eventually would like to initiate low dose ACEI if BP tolerates   -echocardiogram  with severely depressed LVEF     Chest pain  -related to ischemic etiology and JERMAINE  -s/p LAD CHUN   -episode over the weekend felt to be related to volume overload; chest pain free this AM         VTE Risk Mitigation (From admission, onward)    None          HUSAM West, ANP  Cardiology  [facility]

## 2019-04-02 VITALS
HEART RATE: 104 BPM | RESPIRATION RATE: 22 BRPM | HEIGHT: 69 IN | DIASTOLIC BLOOD PRESSURE: 76 MMHG | OXYGEN SATURATION: 100 % | SYSTOLIC BLOOD PRESSURE: 111 MMHG | WEIGHT: 130.5 LBS | TEMPERATURE: 96 F | BODY MASS INDEX: 19.33 KG/M2

## 2019-04-02 LAB
BACTERIA BLD CULT: NORMAL
BACTERIA BLD CULT: NORMAL
MAGNESIUM SERPL-MCNC: 2.2 MG/DL (ref 1.6–2.6)
POTASSIUM SERPL-SCNC: 3.1 MMOL/L (ref 3.5–5.1)

## 2019-04-02 PROCEDURE — 94640 AIRWAY INHALATION TREATMENT: CPT

## 2019-04-02 PROCEDURE — 94761 N-INVAS EAR/PLS OXIMETRY MLT: CPT

## 2019-04-02 PROCEDURE — 25000242 PHARM REV CODE 250 ALT 637 W/ HCPCS: Performed by: STUDENT IN AN ORGANIZED HEALTH CARE EDUCATION/TRAINING PROGRAM

## 2019-04-02 PROCEDURE — 83735 ASSAY OF MAGNESIUM: CPT

## 2019-04-02 PROCEDURE — 25000242 PHARM REV CODE 250 ALT 637 W/ HCPCS: Performed by: NURSE PRACTITIONER

## 2019-04-02 PROCEDURE — 99239 PR HOSPITAL DISCHARGE DAY,>30 MIN: ICD-10-PCS | Mod: ,,, | Performed by: NURSE PRACTITIONER

## 2019-04-02 PROCEDURE — 84132 ASSAY OF SERUM POTASSIUM: CPT

## 2019-04-02 PROCEDURE — 25000003 PHARM REV CODE 250: Performed by: NURSE PRACTITIONER

## 2019-04-02 PROCEDURE — 36415 COLL VENOUS BLD VENIPUNCTURE: CPT

## 2019-04-02 PROCEDURE — 99239 HOSP IP/OBS DSCHRG MGMT >30: CPT | Mod: ,,, | Performed by: NURSE PRACTITIONER

## 2019-04-02 RX ORDER — FUROSEMIDE 20 MG/1
20 TABLET ORAL DAILY
Qty: 30 TABLET | Refills: 11 | Status: SHIPPED | OUTPATIENT
Start: 2019-04-03 | End: 2019-04-02 | Stop reason: HOSPADM

## 2019-04-02 RX ORDER — FUROSEMIDE 20 MG/1
20 TABLET ORAL DAILY
Status: DISCONTINUED | OUTPATIENT
Start: 2019-04-02 | End: 2019-04-02 | Stop reason: HOSPADM

## 2019-04-02 RX ORDER — METOPROLOL SUCCINATE 25 MG/1
12.5 TABLET, EXTENDED RELEASE ORAL DAILY
Qty: 15 TABLET | Refills: 11 | Status: SHIPPED | OUTPATIENT
Start: 2019-04-03 | End: 2019-04-02 | Stop reason: HOSPADM

## 2019-04-02 RX ORDER — POTASSIUM CHLORIDE 20 MEQ/15ML
40 SOLUTION ORAL
Status: DISCONTINUED | OUTPATIENT
Start: 2019-04-02 | End: 2019-04-02 | Stop reason: HOSPADM

## 2019-04-02 RX ORDER — LISINOPRIL 2.5 MG/1
2.5 TABLET ORAL DAILY
Qty: 90 TABLET | Refills: 3
Start: 2019-04-02 | End: 2019-04-06 | Stop reason: SDUPTHER

## 2019-04-02 RX ADMIN — ASPIRIN 81 MG 81 MG: 81 TABLET ORAL at 08:04

## 2019-04-02 RX ADMIN — FLUTICASONE FUROATE AND VILANTEROL TRIFENATATE 1 PUFF: 100; 25 POWDER RESPIRATORY (INHALATION) at 08:04

## 2019-04-02 RX ADMIN — IPRATROPIUM BROMIDE AND ALBUTEROL SULFATE 3 ML: .5; 3 SOLUTION RESPIRATORY (INHALATION) at 08:04

## 2019-04-02 RX ADMIN — METOPROLOL SUCCINATE 12.5 MG: 25 TABLET, EXTENDED RELEASE ORAL at 08:04

## 2019-04-02 RX ADMIN — TICAGRELOR 90 MG: 90 TABLET ORAL at 08:04

## 2019-04-02 RX ADMIN — ATORVASTATIN CALCIUM 40 MG: 40 TABLET, FILM COATED ORAL at 08:04

## 2019-04-02 RX ADMIN — QUETIAPINE FUMARATE 12.5 MG: 25 TABLET, FILM COATED ORAL at 08:04

## 2019-04-02 RX ADMIN — IPRATROPIUM BROMIDE AND ALBUTEROL SULFATE 3 ML: .5; 3 SOLUTION RESPIRATORY (INHALATION) at 12:04

## 2019-04-02 RX ADMIN — POTASSIUM CHLORIDE 40 MEQ: 20 SOLUTION ORAL at 11:04

## 2019-04-02 RX ADMIN — IPRATROPIUM BROMIDE AND ALBUTEROL SULFATE 3 ML: .5; 3 SOLUTION RESPIRATORY (INHALATION) at 03:04

## 2019-04-02 RX ADMIN — FUROSEMIDE 20 MG: 40 TABLET ORAL at 08:04

## 2019-04-02 RX ADMIN — TIOTROPIUM BROMIDE 18 MCG: 18 CAPSULE ORAL; RESPIRATORY (INHALATION) at 08:04

## 2019-04-02 NOTE — PLAN OF CARE
Tn [facility]; Tn stated they have received all paperwork and sending their transporter to  pt and they should be here with in the hour. Tn called and left message on nilda Pride's voicemail and spoke to daughterConcha on phone and reviewed avs. TN gave d/c packet to EDINSON Pérez, ICU and informed to call report . Tn also notified Estee < RN Charge Nurse and Hiral Rick NP to put in d/c order.      Future Appointments   Date Time Provider Department Center   4/17/2019  2:40 PM Abdiel Perez MD [facility] CARDIO [facility]        04/02/19 1217   Final Note   Assessment Type Final Discharge Note   Anticipated Discharge Disposition retirement Nu   What phone number can be called within the next 1-3 days to see how you are doing after discharge? [phone]  (Yudi (daughter))   Hospital Follow Up  Appt(s) scheduled? No   Discharge plans and expectations educations in teach back method with documentation complete? Yes   Right Care Referral Info   Referral Type return to NH

## 2019-04-02 NOTE — PLAN OF CARE
Problem: Adult Inpatient Plan of Care  Goal: Plan of Care Review  Patient on room air with documented SATS and in no apparent distress. Will continue to monitor.

## 2019-04-02 NOTE — CARE UPDATE
[facility]    FACILITY TRANSFER ORDERS      Patient Name: Micky Ochoa  YOB: 1950    PCP: Higinio Brush MD   PCP Address: [address] [place], [place] [address] [address] / [place]  PCP Phone Number: [phone]  PCP Fax: [phone]    Encounter Date: 04/02/2019    Admit to: [facility]     Vital Signs:  Routine    Diagnoses:   Active Hospital Problems    Diagnosis  POA    *STEMI (ST elevation myocardial infarction) [I21.3]  Yes    Acute combined systolic and diastolic heart failure [I50.41]  Yes    Coronary artery disease involving native coronary artery [I25.10]  Yes    Tachycardia [R00.0]  Yes    Acute respiratory failure [J96.00]  Yes     Nutrition Problem  Inadequate oral intake    Related to (etiology):   Recent STEMI with need for BI-pap  Previous Medical History of Dysphagia    Signs and Symptoms (as evidenced by):   NPO status     Interventions:  Collaboration with other providers     Nutrition Diagnosis Status:   Improving          Hypokalemia [E87.6]  Yes    Leukocytosis [D72.829]  Yes    Chest pain [R07.9]  Yes      Resolved Hospital Problems   No resolved problems to display.       Allergies:  Review of patient's allergies indicates:   Allergen Reactions    Codeine        Diet: cardiac diet    Activities: Activity as tolerated       CONSULTS:    Physical Therapy to evaluate and treat. , Occupational Therapy to evaluate and treat. and Speech Therapy to evaluate and treat for Swallowing.      Medications: Review discharge medications with patient and family and provide education.      Current Discharge Medication List      START taking these medications    Details   aspirin 81 MG Chew Take 1 tablet (81 mg total) by mouth once daily.  Refills: 0      atorvastatin (LIPITOR) 40 MG tablet Take 1 tablet (40 mg total) by mouth once daily.  Qty: 90 tablet, Refills: 3      fluticasone-vilanterol (BREO) 100-25 mcg/dose diskus inhaler Inhale 1 puff into the lungs  once daily. Controller  Qty: 60 each, Refills: 0      furosemide (LASIX) 20 MG tablet Take 1 tablet (20 mg total) by mouth once daily.  Qty: 30 tablet, Refills: 11      lisinopril (PRINIVIL,ZESTRIL) 2.5 MG tablet Take 1 tablet (2.5 mg total) by mouth once daily.  Qty: 90 tablet, Refills: 3      metoprolol succinate (TOPROL-XL) 25 MG 24 hr tablet Take 1/2 tablets (12.5 mg total) by mouth once daily.  Qty: 15 tablet, Refills: 11      nitroGLYCERIN (NITROSTAT) 0.4 MG SL tablet Place 1 tablet (0.4 mg total) under the tongue every 5 (five) minutes as needed for Chest pain (If chest pain is unrelieved by 3 doses, call MD).  Qty: 25 tablet, Refills: 11      !! QUEtiapine (SEROQUEL) 25 MG Tab 12.5mg po daily  Qty: 30 tablet, Refills: 11      !! QUEtiapine (SEROQUEL) 25 MG Tab 12.52mg po before dinner  Qty: 30 tablet, Refills: 11      !! QUEtiapine (SEROQUEL) 25 MG Tab Take 1 tablet (25 mg total) by mouth every evening.  Qty: 30 tablet, Refills: 11      ticagrelor (BRILINTA) 90 mg tablet Take 1 tablet (90 mg total) by mouth 2 (two) times daily.  Qty: 60 tablet, Refills: 11      tiotropium (SPIRIVA) 18 mcg inhalation capsule Inhale 1 capsule (18 mcg total) into the lungs once daily. Controller  Qty: 30 capsule, Refills: 0       !! - Potential duplicate medications found. Please discuss with provider.               _________________________________  HUSAM West, MYNOR  04/02/2019

## 2019-04-02 NOTE — SUBJECTIVE & OBJECTIVE
Objective:     Vital Signs (Most Recent):  Temp: 97.1 °F (36.2 °C) (04/02/19 0709)  Pulse: 100 (04/02/19 0930)  Resp: 20 (04/02/19 0930)  BP: 111/67 (04/02/19 0900)  SpO2: 99 % (04/02/19 0930) Vital Signs (24h Range):  Temp:  [97.1 °F (36.2 °C)-98.6 °F (37 °C)] 97.1 °F (36.2 °C)  Pulse:  [] 100  Resp:  [18-42] 20  SpO2:  [96 %-100 %] 99 %  BP: ()/(53-78) 111/67     Weight: 59.2 kg (130 lb 8.2 oz)  Body mass index is 19.27 kg/m².     SpO2: 99 %  O2 Device (Oxygen Therapy): room air      Intake/Output Summary (Last 24 hours) at 4/2/2019 1021  Last data filed at 4/2/2019 0900  Gross per 24 hour   Intake 250 ml   Output 1000 ml   Net -750 ml       Lines/Drains/Airways     Peripheral Intravenous Line                 Peripheral IV - Single Lumen 03/29/19 2155 Left Forearm 3 days                    Significant Labs:     Creatinine 1.3; K+ 3.1 Mg 2.2    Significant Imaging:     TTE 3/28/2019    · Normal right ventricular systolic function.  · Severely decreased left ventricular systolic function. The estimated ejection fraction is 25%  · Left ventricular diastolic dysfunction.  · Severe left atrial enlargement.  · Normal central venous pressure (3 mm Hg).  · The estimated PA systolic pressure is 25 mm Hg

## 2019-04-02 NOTE — PLAN OF CARE
Pt confused; Tn reviewed notes and pt had stepdown orders written yesterday but no beds available on floor. Pt is a resident at [facility] and updated clinicals and facility transfer orders sent via [facility]. Tn informed ANNA Padilla NP pt needs to be out of restraints for 24 hours prior to d/c and date on facility transfer orders will need to be changed when pt actually discharged.  Tn to continue to follow.     04/02/19 1025   Post-Acute Status   Post-Acute Authorization Placement   Post-Acute Placement Status Additional Clinical Requested         1045- Tn spoke to Avril at [facility] to inform orders sent and inform pt has been in restraints due to pulling out iv but pt will not need IV at NH ; Avril informed Tn they will review orders and may be able to take pt today and will send transport. TN informed Beto RN , ICU nurse of [facility]ibole d/c today

## 2019-04-02 NOTE — ASSESSMENT & PLAN NOTE
-echo with severely depressed LV function with EF 25% and diastolic dysfunction  -required BiPap and IV Lasix due to acute pulmonary edema post procedure  -good response with IV Lasix and negative 3.5 liters since admission  -placed on oral Lasix for maintenance of euvolemia; needs close monitoring to avoid overdiuresis given swallowing difficulties and minimal intake at times   -strict I&Os and daily weights

## 2019-04-02 NOTE — ASSESSMENT & PLAN NOTE
-related to ischemic etiology and JERMAINE  -s/p LAD CHUN   -chest pain free overnight and denies complaints of chest pain this AM

## 2019-04-02 NOTE — ASSESSMENT & PLAN NOTE
-presented with STEMI and elevated troponin  -emergent LHC with LAD CHUN x 3  -continue ASA, statin and Brilinta; initiated low dose BB with tolerance so far; plan to initiate low dose ACEI as well as long as BP remains stable   -echocardiogram  with severely depressed LVEF

## 2019-04-02 NOTE — ASSESSMENT & PLAN NOTE
-presented with chest pain and JERMAINE to anterior leads  -emergent LHC with LAD CHUN x 3; repeat EKG this AM with continued slight improvement in JERMAINE    -continue DAPT, statin and low dose BB

## 2019-04-02 NOTE — PROGRESS NOTES
Future Appointments   Date Time Provider Department Center   4/17/2019  2:40 PM Abdiel Perez MD [facility] CARDIO [facility]      Established Patient Visit with Abdiel Perez MD   Wednesday Apr 17, 2019 2:40 PM   Arrive at check-in approximately 15 minutes before your scheduled appointment time. Bring all outside medical records and imaging, along with a list of your current medications and insurance card.    2nd Floor [facility] [address]  [place] - Cardiology   [address], [address]  [facility] [address] [phone]

## 2019-04-02 NOTE — PROGRESS NOTES
[facility]  Cardiology  Progress Note    Patient Name: Micky Ochoa  MRN: [id number]  Admission Date: 3/27/2019  Hospital Length of Stay: 6 days  Code Status: No Order   Attending Physician: Abdiel Perez MD   Primary Care Physician: Higinio Brush MD  Expected Discharge Date:   Principal Problem:STEMI (ST elevation myocardial infarction)    Subjective:     Hospital Course:   3/27/2019 Presented to the ER with complaints of chest pain. Initial troponin 16.027 with trend down to 15.439. EKG with JERMAINE to anterior leads. BMP WNL. CBC with WBCs 14K and temp 100.4- cultures ordered. Given IV Vanc and Zosyn. Taken to cath lab for emergent procedure  3/28/2019 Emergent LHC yesterday for JERMAINE with occluded LAD treated with LAD CHUN x 3 and elevated LVEDP. Acute distress with hypoxia and tahcypnea noted post procedure. Given IV Lasix and placed on BiPap admitted to ICU. HR elevated in the 120s ST since admission with stable BP. Tachypnea persists with stable sats on BiPap. Repeat IV Lasix ordered. ABG done with pH 7.51 pCO2 30.3 pO2 116 HCO3 24.3 BiPap adjusted from 12/6 to 15/6. BMP with K+ 3.3-replacement ordered. Creatinine 1.0. CBC with WBC 19K-blood cultures pending. Continue GDMT with IV Lasix and BiPap for volume removal. Echocardiogram today with pending results   3/29/2019 Agitated overnight with initiation of Precedex per eICU. Psych consulted with recommendations noted. Off Bipap with improvement in tachypnea noted today. 2.1 liters out overnight and negative 1.6 liters since admission. HR trended down to 90s from 110s-120s. Marginal BP yesterday with trend down to 80s-90s overnight with MAPs [id number] discontinue Toprol XL for now. K+ 2.9 with IV replacement ordered-NPO until seen by speech. Blood cultures with NGTD. Sputum culture pending. Remains on IV abx and appreciate Pulmonary recs  3/30/2019 No acute issues last night. SBP marginal. Remains off ACEI and BB  3/31/2019Seen this AM.  Complained of mild chest pain and dyspnea. Pain improved with SL NTG controlled the pain and IV lasix given for dyspnea. Appears better currently. PT and OT on board and at baseline functional status per notes. Hopeful to discharge back to [facility]  4/1/2019 Resting comfortably this AM. HR stable. SBP 110s-130s with BP down to 88/51 this AM. Blood cultures with NGTD. Sputum culture not collected due to no sputum production. Pulmonary note reviewed from 3/30 with low suspicion of PNA and recommendation for de escalation of antibiotics. Will give dose of oral Lasix today. Apprehensive to discharge home on Lasix due to minimal po intake and baseline dementia. Continue medical management for CAD and CHF although limited due to marginal BPs. Plan to discharge to [facility] today. Medically stable for discharge back to [facility] however they require patient to be transferred out of ICU to floor prior to accepting patient back to facility. HR and BP stable. Transfer orders written   4/2/2019 Stable overnight with no acute issues. HR and BP stable. Placed on oral Lasix yesterday with 1.5 liters out overnight and no documented intake. K+ 3.1 Mg 2.2 Creatinine 1.3-will replace K+ with oral potassium. Started on low dose BB as well. Awaiting transfer to the floor in order to be able to be transitioned back to [facility]-medically stable and awaiting transition back to NH                   Objective:     Vital Signs (Most Recent):  Temp: 97.1 °F (36.2 °C) (04/02/19 0709)  Pulse: 100 (04/02/19 0930)  Resp: 20 (04/02/19 0930)  BP: 111/67 (04/02/19 0900)  SpO2: 99 % (04/02/19 0930) Vital Signs (24h Range):  Temp:  [97.1 °F (36.2 °C)-98.6 °F (37 °C)] 97.1 °F (36.2 °C)  Pulse:  [] 100  Resp:  [18-42] 20  SpO2:  [96 %-100 %] 99 %  BP: ()/(53-78) 111/67     Weight: 59.2 kg (130 lb 8.2 oz)  Body mass index is 19.27 kg/m².     SpO2: 99 %  O2 Device (Oxygen Therapy): room  air      Intake/Output Summary (Last 24 hours) at 4/2/2019 1021  Last data filed at 4/2/2019 0900  Gross per 24 hour   Intake 250 ml   Output 1000 ml   Net -750 ml       Lines/Drains/Airways     Peripheral Intravenous Line                 Peripheral IV - Single Lumen 03/29/19 2155 Left Forearm 3 days                    Significant Labs:     Creatinine 1.3; K+ 3.1 Mg 2.2    Significant Imaging:     TTE 3/28/2019    · Normal right ventricular systolic function.  · Severely decreased left ventricular systolic function. The estimated ejection fraction is 25%  · Left ventricular diastolic dysfunction.  · Severe left atrial enlargement.  · Normal central venous pressure (3 mm Hg).  · The estimated PA systolic pressure is 25 mm Hg    Assessment and Plan:     Brief HPI: Seen on AM NP rounds while resting in bed. Denies any complaints. POC reviewed with patient but uncertain if he fully grasps POC. Updated daughter yesterday via phone with complete understanding and agreement of POC     * STEMI (ST elevation myocardial infarction)  -presented with chest pain and JERMAINE to anterior leads  -emergent LHC with LAD CHUN x 3; repeat EKG this AM with continued slight improvement in JERMAINE    -continue DAPT, statin and low dose BB     Acute combined systolic and diastolic heart failure  -echo with severely depressed LV function with EF 25% and diastolic dysfunction  -required BiPap and IV Lasix due to acute pulmonary edema post procedure  -good response with IV Lasix and negative 3.5 liters since admission  -placed on oral Lasix for maintenance of euvolemia; needs close monitoring to avoid overdiuresis given swallowing difficulties and minimal intake at times   -strict I&Os and daily weights     Leukocytosis  -resolved  -afebrile overnight  -blood cultures negative and abx de escalated     Hypokalemia  -K+ 3.1  -will replace with oral potassium     Acute respiratory failure  -tachypnea with desaturation post procedure  -LVEDP 30mmHG; CXR  with evidence of pulmonary vascular congestion/interstitial edema bilaterally.  -IV diuresis with IV Lasix BID with good response; weaned off BiPap and treated initially for concern for PNA; cultures negative and abx de escalated per Pulmonary recommendations '  -given oral Lasix today; will plan to discharge on low dose Lasix; decision for low dose due to dementia and minimal po intake and concern for overdiuresis     Tachycardia  -improved  -related to stress response  -continue low dose BB therapy    Coronary artery disease involving native coronary artery  -presented with STEMI and elevated troponin  -emergent LHC with LAD CHUN x 3  -continue ASA, statin and Brilinta; initiated low dose BB with tolerance so far; plan to initiate low dose ACEI as well as long as BP remains stable   -echocardiogram  with severely depressed LVEF     Chest pain  -related to ischemic etiology and JERMAINE  -s/p LAD CHUN   -chest pain free overnight and denies complaints of chest pain this AM         VTE Risk Mitigation (From admission, onward)    None          HUSAM West, ANP  Cardiology  [facility]

## 2019-04-02 NOTE — DISCHARGE SUMMARY
[facility]  Cardiology  Discharge Summary      Patient Name: Micky Ochoa  MRN: [id number]  Admission Date: 3/27/2019  Hospital Length of Stay: 6 days  Discharge Date and Time: 4/2/2019  Attending Physician: No att. providers found  Discharging Provider: HUSAM West, ANP  Primary Care Physician: Higinio Brush MD    HPI:  69yo male with history of dementia who presented with acute chest pressure from  [facility]. No prior records in [facility] E[facility]. ecg was significant for acute JERMAINE in the v1-3 and reciporal ST depression in lateral leads.  Will call code stemi           Procedure(s) (LRB):  CATHETERIZATION, HEART, LEFT (Left)  Stent, Drug Eluting, Single Vessel, Coronary  Percutaneous coronary intervention (N/A)  ANGIOGRAM, CORONARY ARTERY (N/A)  Ultrasound-coronary (N/A)     Indwelling Lines/Drains at time of discharge:  Lines/Drains/Airways          None          Hospital Course     3/27/2019 Presented to the ER with complaints of chest pain. Initial troponin 16.027 with trend down to 15.439. EKG with JERMAINE to anterior leads. BMP WNL. CBC with WBCs 14K and temp 100.4- cultures ordered. Given IV Vanc and Zosyn. Taken to cath lab for emergent procedure  3/28/2019 Emergent LHC yesterday for JERMAINE with occluded LAD treated with LAD CHUN x 3 and elevated LVEDP. Acute distress with hypoxia and tahcypnea noted post procedure. Given IV Lasix and placed on BiPap admitted to ICU. HR elevated in the 120s ST since admission with stable BP. Tachypnea persists with stable sats on BiPap. Repeat IV Lasix ordered. ABG done with pH 7.51 pCO2 30.3 pO2 116 HCO3 24.3 BiPap adjusted from 12/6 to 15/6. BMP with K+ 3.3-replacement ordered. Creatinine 1.0. CBC with WBC 19K-blood cultures pending. Continue GDMT with IV Lasix and BiPap for volume removal. Echocardiogram today with pending results   3/29/2019 Agitated overnight with initiation of Precedex per eICU. Psych consulted with recommendations noted.  Off Bipap with improvement in tachypnea noted today. 2.1 liters out overnight and negative 1.6 liters since admission. HR trended down to 90s from 110s-120s. Marginal BP yesterday with trend down to 80s-90s overnight with MAPs [id number] discontinue Toprol XL for now. K+ 2.9 with IV replacement ordered-NPO until seen by speech. Blood cultures with NGTD. Sputum culture pending. Remains on IV abx and appreciate Pulmonary recs  3/30/2019 No acute issues last night. SBP marginal. Remains off ACEI and BB  3/31/2019Seen this AM. Complained of mild chest pain and dyspnea. Pain improved with SL NTG controlled the pain and IV lasix given for dyspnea. Appears better currently. PT and OT on board and at baseline functional status per notes. Hopeful to discharge back to [facility]  4/1/2019 Resting comfortably this AM. HR stable. SBP 110s-130s with BP down to 88/51 this AM. Blood cultures with NGTD. Sputum culture not collected due to no sputum production. Pulmonary note reviewed from 3/30 with low suspicion of PNA and recommendation for de escalation of antibiotics. Will give dose of oral Lasix today. Apprehensive to discharge home on Lasix due to minimal po intake and baseline dementia. Continue medical management for CAD and CHF although limited due to marginal BPs. Plan to discharge to [facility] today. Medically stable for discharge back to [facility] however they require patient to be transferred out of ICU to floor prior to accepting patient back to facility. HR and BP stable. Transfer orders written   4/2/2019 Stable overnight with no acute issues. HR and BP stable. Placed on oral Lasix yesterday with 1.5 liters out overnight and no documented intake. K+ 3.1 Mg 2.2 Creatinine 1.3-will replace K+ with oral potassium. Started on low dose BB as well. Awaiting transfer to the floor vs transition back to [facility]-medically stable. Will follow up with Dr. Perez         Consults:   Consults (From admission, onward)        Status Ordering Provider     Inpatient consult to Psychiatry  Once     Provider:  (Not yet assigned)    Acknowledged OZIEL NATARAJAN     Inpatient consult to Pulmonology  Once     Provider:  (Not yet assigned)    Completed OZIEL NATARAJAN          Echocardiogram    · Normal right ventricular systolic function.  · Severely decreased left ventricular systolic function. The estimated ejection fraction is 25%  · Left ventricular diastolic dysfunction.  · Severe left atrial enlargement.  · Normal central venous pressure (3 mm Hg).  · The estimated PA systolic pressure is 25 mm Hg    Pending Diagnostic Studies:     Procedure Component Value Units Date/Time    X-Ray Chest AP Portable [[id number]]     Order Status:  Sent Lab Status:  No result           Final Active Diagnoses:    Diagnosis Date Noted POA    PRINCIPAL PROBLEM:  STEMI (ST elevation myocardial infarction) [I21.3] 03/28/2019 Yes    Acute combined systolic and diastolic heart failure [I50.41] 03/29/2019 Yes    Coronary artery disease involving native coronary artery [I25.10] 03/28/2019 Yes    Tachycardia [R00.0] 03/28/2019 Yes    Acute respiratory failure [J96.00] 03/28/2019 Yes    Hypokalemia [E87.6] 03/28/2019 Yes    Leukocytosis [D72.829] 03/28/2019 Yes    Chest pain [R07.9] 03/27/2019 Yes      Problems Resolved During this Admission:       Discharged Condition: good    Follow Up:  Follow-up Information     Abdiel Perez MD On 4/17/2019.    Specialties:  INTERVENTIONAL CARDIOLOGY, Cardiology  Why:  time: 2:40  Contact information:  [address]  [address]  [facility] [address] [phone]                 Patient Instructions:      Diet Cardiac     No dressing needed     Activity as tolerated     Medications:  Reconciled Home Medications:      Medication List      START taking these medications    aspirin 81 MG Chew  Take 1 tablet (81 mg total) by mouth once daily.     atorvastatin  40 MG tablet  Commonly known as:  LIPITOR  Take 1 tablet (40 mg total) by mouth once daily.     fluticasone-vilanterol 100-25 mcg/dose diskus inhaler  Commonly known as:  BREO  Inhale 1 puff into the lungs once daily. Controller     furosemide 20 MG tablet  Commonly known as:  LASIX  Take 1 tablet (20 mg total) by mouth once daily.     lisinopril 2.5 MG tablet  Commonly known as:  PRINIVIL,ZESTRIL  Take 1 tablet (2.5 mg total) by mouth once daily.     metoprolol succinate 25 MG 24 hr tablet  Commonly known as:  TOPROL-XL  Take 1/2 tablets (12.5 mg total) by mouth once daily.     nitroGLYCERIN 0.4 MG SL tablet  Commonly known as:  NITROSTAT  Place 1 tablet (0.4 mg total) under the tongue every 5 (five) minutes as needed for Chest pain (If chest pain is unrelieved by 3 doses, call MD).     * QUEtiapine 25 MG Tab  Commonly known as:  SEROQUEL  12.5mg po daily     * QUEtiapine 25 MG Tab  Commonly known as:  SEROQUEL  12.52mg po before dinner     * QUEtiapine 25 MG Tab  Commonly known as:  SEROQUEL  Take 1 tablet (25 mg total) by mouth every evening.     ticagrelor 90 mg tablet  Commonly known as:  BRILINTA  Take 1 tablet (90 mg total) by mouth 2 (two) times daily.     tiotropium 18 mcg inhalation capsule  Commonly known as:  SPIRIVA  Inhale 1 capsule (18 mcg total) into the lungs once daily. Controller         * This list has 3 medication(s) that are the same as other medications prescribed for you. Read the directions carefully, and ask your doctor or other care provider to review them with you.                Time spent on the discharge of patient: 45 minutes    HUSAM West, ANP  Cardiology  [facility]

## 2019-04-03 NOTE — PT/OT/SLP DISCHARGE
Physical Therapy Discharge Summary    Name: Micky Ochoa  MRN: [id number]   Principal Problem: STEMI (ST elevation myocardial infarction)     Patient Discharged from acute Physical Therapy on 2019.  Please refer to prior PT noted date on  for functional status.     Assessment:     Patient d/c'd from hospital    Objective:     GOALS:   Multidisciplinary Problems     Physical Therapy Goals        Problem: Physical Therapy Goal    Goal Priority Disciplines Outcome Goal Variances Interventions   Physical Therapy Goal     PT, PT/OT Ongoing (interventions implemented as appropriate)     Description:  Goals to be met by: 2019     Patient will increase functional independence with mobility by performin. Supine to sit with supervision  2. Sit to supine with supervision  3. Rolling to Left and Right with Modified [place].  4. Sit to stand transfer with Supervision  5. Gait  x 100 feet with Stand-by Assistance/supervision with or without AD   6. Lower extremity exercise program x10-15 reps with supervision                      Reasons for Discontinuation of Therapy Services  Transfer to alternate level of care.      Plan:     Patient Discharged to: back to [facility].    Jadyn Torres, PT  2019

## 2019-04-04 ENCOUNTER — HOSPITAL ENCOUNTER (OUTPATIENT)
Facility: HOSPITAL | Age: 69
Discharge: HOSPICE | End: 2019-04-06
Attending: EMERGENCY MEDICINE | Admitting: INTERNAL MEDICINE
Payer: MEDICARE

## 2019-04-04 DIAGNOSIS — Z71.89 GOALS OF CARE, COUNSELING/DISCUSSION: ICD-10-CM

## 2019-04-04 DIAGNOSIS — R07.9 CHEST PAIN: ICD-10-CM

## 2019-04-04 DIAGNOSIS — I25.110 CORONARY ARTERY DISEASE INVOLVING NATIVE CORONARY ARTERY OF NATIVE HEART WITH UNSTABLE ANGINA PECTORIS: ICD-10-CM

## 2019-04-04 DIAGNOSIS — Z51.5 PALLIATIVE CARE ENCOUNTER: ICD-10-CM

## 2019-04-04 DIAGNOSIS — R07.9 CHEST PAIN, UNSPECIFIED TYPE: ICD-10-CM

## 2019-04-04 DIAGNOSIS — Z71.89 COUNSELING REGARDING ADVANCED CARE PLANNING AND GOALS OF CARE: ICD-10-CM

## 2019-04-04 DIAGNOSIS — R79.89 ELEVATED TROPONIN: Primary | ICD-10-CM

## 2019-04-04 DIAGNOSIS — I21.4 NSTEMI (NON-ST ELEVATED MYOCARDIAL INFARCTION): ICD-10-CM

## 2019-04-04 LAB
ALBUMIN SERPL BCP-MCNC: 3.1 G/DL (ref 3.5–5.2)
ALP SERPL-CCNC: 65 U/L (ref 55–135)
ALT SERPL W/O P-5'-P-CCNC: 16 U/L (ref 10–44)
ANION GAP SERPL CALC-SCNC: 11 MMOL/L (ref 8–16)
ANISOCYTOSIS BLD QL SMEAR: SLIGHT
APTT BLDCRRT: 28.7 SEC (ref 21–32)
AST SERPL-CCNC: 36 U/L (ref 10–40)
BASOPHILS # BLD AUTO: ABNORMAL K/UL (ref 0–0.2)
BASOPHILS NFR BLD: 0 % (ref 0–1.9)
BILIRUB SERPL-MCNC: 0.6 MG/DL (ref 0.1–1)
BNP SERPL-MCNC: 2243 PG/ML (ref 0–99)
BUN SERPL-MCNC: 21 MG/DL (ref 8–23)
CALCIUM SERPL-MCNC: 8.9 MG/DL (ref 8.7–10.5)
CHLORIDE SERPL-SCNC: 107 MMOL/L (ref 95–110)
CO2 SERPL-SCNC: 22 MMOL/L (ref 23–29)
CREAT SERPL-MCNC: 0.9 MG/DL (ref 0.5–1.4)
DIFFERENTIAL METHOD: ABNORMAL
EOSINOPHIL # BLD AUTO: ABNORMAL K/UL (ref 0–0.5)
EOSINOPHIL NFR BLD: 17 % (ref 0–8)
ERYTHROCYTE [DISTWIDTH] IN BLOOD BY AUTOMATED COUNT: 16.6 % (ref 11.5–14.5)
EST. GFR  (AFRICAN AMERICAN): >60 ML/MIN/1.73 M^2
EST. GFR  (NON AFRICAN AMERICAN): >60 ML/MIN/1.73 M^2
GLUCOSE SERPL-MCNC: 94 MG/DL (ref 70–110)
HCT VFR BLD AUTO: 34.6 % (ref 40–54)
HGB BLD-MCNC: 10.6 G/DL (ref 14–18)
INR PPP: 1.1 (ref 0.8–1.2)
LYMPHOCYTES # BLD AUTO: ABNORMAL K/UL (ref 1–4.8)
LYMPHOCYTES NFR BLD: 14 % (ref 18–48)
MCH RBC QN AUTO: 32.3 PG (ref 27–31)
MCHC RBC AUTO-ENTMCNC: 30.6 G/DL (ref 32–36)
MCV RBC AUTO: 106 FL (ref 82–98)
MONOCYTES # BLD AUTO: ABNORMAL K/UL (ref 0.3–1)
MONOCYTES NFR BLD: 6 % (ref 4–15)
NEUTROPHILS NFR BLD: 63 % (ref 38–73)
PLATELET # BLD AUTO: 358 K/UL (ref 150–350)
PLATELET BLD QL SMEAR: ABNORMAL
PMV BLD AUTO: 11.9 FL (ref 9.2–12.9)
POC ACTIVATED CLOTTING TIME K: 279 SEC (ref 74–137)
POLYCHROMASIA BLD QL SMEAR: ABNORMAL
POTASSIUM SERPL-SCNC: 4.5 MMOL/L (ref 3.5–5.1)
PROT SERPL-MCNC: 7.8 G/DL (ref 6–8.4)
PROTHROMBIN TIME: 11.3 SEC (ref 9–12.5)
RBC # BLD AUTO: 3.28 M/UL (ref 4.6–6.2)
SAMPLE: ABNORMAL
SODIUM SERPL-SCNC: 140 MMOL/L (ref 136–145)
TROPONIN I SERPL DL<=0.01 NG/ML-MCNC: 1.27 NG/ML (ref 0–0.03)
WBC # BLD AUTO: 15.12 K/UL (ref 3.9–12.7)

## 2019-04-04 PROCEDURE — G0378 HOSPITAL OBSERVATION PER HR: HCPCS

## 2019-04-04 PROCEDURE — 96374 THER/PROPH/DIAG INJ IV PUSH: CPT

## 2019-04-04 PROCEDURE — 84484 ASSAY OF TROPONIN QUANT: CPT

## 2019-04-04 PROCEDURE — 85007 BL SMEAR W/DIFF WBC COUNT: CPT

## 2019-04-04 PROCEDURE — 85610 PROTHROMBIN TIME: CPT

## 2019-04-04 PROCEDURE — 83880 ASSAY OF NATRIURETIC PEPTIDE: CPT

## 2019-04-04 PROCEDURE — 96372 THER/PROPH/DIAG INJ SC/IM: CPT | Mod: 59

## 2019-04-04 PROCEDURE — 80053 COMPREHEN METABOLIC PANEL: CPT

## 2019-04-04 PROCEDURE — 63600175 PHARM REV CODE 636 W HCPCS: Performed by: EMERGENCY MEDICINE

## 2019-04-04 PROCEDURE — 25000003 PHARM REV CODE 250: Performed by: EMERGENCY MEDICINE

## 2019-04-04 PROCEDURE — 99291 CRITICAL CARE FIRST HOUR: CPT | Mod: 25

## 2019-04-04 PROCEDURE — 85730 THROMBOPLASTIN TIME PARTIAL: CPT

## 2019-04-04 PROCEDURE — 85027 COMPLETE CBC AUTOMATED: CPT

## 2019-04-04 PROCEDURE — 99292 CRITICAL CARE ADDL 30 MIN: CPT

## 2019-04-04 PROCEDURE — 93005 ELECTROCARDIOGRAM TRACING: CPT

## 2019-04-04 RX ORDER — MORPHINE SULFATE 4 MG/ML
4 INJECTION, SOLUTION INTRAMUSCULAR; INTRAVENOUS EVERY 4 HOURS PRN
Status: DISCONTINUED | OUTPATIENT
Start: 2019-04-04 | End: 2019-04-06 | Stop reason: HOSPADM

## 2019-04-04 RX ORDER — METOCLOPRAMIDE HYDROCHLORIDE 5 MG/ML
10 INJECTION INTRAMUSCULAR; INTRAVENOUS EVERY 6 HOURS PRN
Status: DISCONTINUED | OUTPATIENT
Start: 2019-04-04 | End: 2019-04-06 | Stop reason: HOSPADM

## 2019-04-04 RX ORDER — MORPHINE SULFATE 4 MG/ML
2 INJECTION, SOLUTION INTRAMUSCULAR; INTRAVENOUS EVERY 4 HOURS PRN
Status: DISCONTINUED | OUTPATIENT
Start: 2019-04-04 | End: 2019-04-06 | Stop reason: HOSPADM

## 2019-04-04 RX ORDER — ATORVASTATIN CALCIUM 40 MG/1
40 TABLET, FILM COATED ORAL DAILY
Status: DISCONTINUED | OUTPATIENT
Start: 2019-04-05 | End: 2019-04-06 | Stop reason: HOSPADM

## 2019-04-04 RX ORDER — FUROSEMIDE 10 MG/ML
40 INJECTION INTRAMUSCULAR; INTRAVENOUS
Status: COMPLETED | OUTPATIENT
Start: 2019-04-04 | End: 2019-04-04

## 2019-04-04 RX ORDER — ONDANSETRON 2 MG/ML
4 INJECTION INTRAMUSCULAR; INTRAVENOUS EVERY 6 HOURS PRN
Status: DISCONTINUED | OUTPATIENT
Start: 2019-04-04 | End: 2019-04-06 | Stop reason: HOSPADM

## 2019-04-04 RX ORDER — ACETAMINOPHEN 325 MG/1
650 TABLET ORAL EVERY 6 HOURS PRN
Status: DISCONTINUED | OUTPATIENT
Start: 2019-04-04 | End: 2019-04-06 | Stop reason: HOSPADM

## 2019-04-04 RX ORDER — ENOXAPARIN SODIUM 100 MG/ML
1 INJECTION SUBCUTANEOUS
Status: COMPLETED | OUTPATIENT
Start: 2019-04-04 | End: 2019-04-04

## 2019-04-04 RX ORDER — SODIUM CHLORIDE 0.9 % (FLUSH) 0.9 %
10 SYRINGE (ML) INJECTION
Status: DISCONTINUED | OUTPATIENT
Start: 2019-04-04 | End: 2019-04-06 | Stop reason: HOSPADM

## 2019-04-04 RX ORDER — LISINOPRIL 2.5 MG/1
2.5 TABLET ORAL DAILY
Status: DISCONTINUED | OUTPATIENT
Start: 2019-04-05 | End: 2019-04-05

## 2019-04-04 RX ORDER — NAPROXEN SODIUM 220 MG/1
81 TABLET, FILM COATED ORAL DAILY
Status: DISCONTINUED | OUTPATIENT
Start: 2019-04-05 | End: 2019-04-06 | Stop reason: HOSPADM

## 2019-04-04 RX ADMIN — QUETIAPINE FUMARATE 12.5 MG: 25 TABLET, FILM COATED ORAL at 09:04

## 2019-04-04 RX ADMIN — ACETAMINOPHEN 650 MG: 325 TABLET ORAL at 09:04

## 2019-04-04 RX ADMIN — ENOXAPARIN SODIUM 60 MG: 100 INJECTION SUBCUTANEOUS at 07:04

## 2019-04-04 RX ADMIN — FUROSEMIDE 40 MG: 10 INJECTION, SOLUTION INTRAVENOUS at 07:04

## 2019-04-04 RX ADMIN — TICAGRELOR 90 MG: 90 TABLET ORAL at 09:04

## 2019-04-04 NOTE — ED PROVIDER NOTES
Encounter Date: 4/4/2019    SCRIBE #1 NOTE: I, Marlon Mccann, am scribing for, and in the presence of,  . I have scribed the entire note.       History     Chief Complaint   Patient presents with    Chest Pain     from [facility] for CP x 30 min. recent MI with stents x 1 week ago. patient received 3 SL NTG per nursing home and 324 mg of ASA, arrives with pain 10/10     67 y/o M brought to the ED by EMS from [facility]. History limited by dementia. NH reports patient was c/o CP and SOB, confirmed by patient in ED. Unable to describe pain, but reports it is significantly painful. EMS reports patient was c/o SOB at the NH, which has apparently resolved. Patient was given SL nitro x 3 PTA of EMS at NH, and initial BP was 80 systolic, which has improved en route. Patient takes 81mg ASA QDay, was given 243mg en route by EMS.       The history is provided by a relative.     Review of patient's allergies indicates:   Allergen Reactions    Codeine      Past Medical History:   Diagnosis Date    Anemia     COPD (chronic obstructive pulmonary disease)     Dementia     GERD (gastroesophageal reflux disease)     Migraine headache     Neuropathy     Pancreatitis, chronic      Past Surgical History:   Procedure Laterality Date    ANGIOGRAM, CORONARY ARTERY N/A 3/27/2019    Performed by [facility]iel [facility] at [facility] CATH LAB/EP    CATHETERIZATION, HEART, LEFT Left 3/27/2019    Performed by [facility] at [facility] CATH LAB/EP    Percutaneous coronary intervention N/A 3/27/2019    Performed by [facility] at [facility] CATH LAB/EP    Stent, Drug Eluting, Single Vessel, Coronary  3/27/2019    Performed by [facility] at [facility] CATH LAB/EP    TONSILLECTOMY      Ultrasound-coronary N/A 3/27/2019    Performed by Abdiel Perez MD at [facility] CATH LAB/EP     History reviewed. No pertinent family history.  Social History     Tobacco Use    Smoking status: Former Smoker   Substance Use Topics     Alcohol use: Not Currently    Drug use: Not on file     Review of Systems   Unable to perform ROS: Dementia   Skin: Negative for rash.       Physical Exam     Initial Vitals   BP Pulse Resp Temp SpO2   -- -- -- -- --      MAP       --         Physical Exam    Nursing note and vitals reviewed.  Constitutional: He appears well-developed and well-nourished. No distress.   HENT:   Head: Normocephalic and atraumatic.   Oropharynx clear; Dry MM   Eyes: Conjunctivae and EOM are normal. Pupils are equal, round, and reactive to light.   Neck: Normal range of motion. Neck supple. No tracheal deviation present.   Cardiovascular: Normal rate, regular rhythm, normal heart sounds and intact distal pulses.   Pulmonary/Chest: Breath sounds normal. No respiratory distress. He has no wheezes. He has no rhonchi. He has no rales.   Abdominal: Soft. Bowel sounds are normal. He exhibits no distension. There is no tenderness.   Musculoskeletal: Normal range of motion. He exhibits no tenderness.   Neurological: He is alert. He has normal strength. No cranial nerve deficit. GCS score is 15. GCS eye subscore is 4. GCS verbal subscore is 5. GCS motor subscore is 6.   Skin: Skin is warm and dry. Capillary refill takes less than 2 seconds.         ED Course   Critical Care  Date/Time: 4/4/2019 7:56 PM  Performed by: Kennedy Flower MD  Authorized by: Kennedy Flower MD   Direct patient critical care time: 15 minutes  Additional history critical care time: 15 minutes  Ordering / reviewing critical care time: 15 minutes  Documentation critical care time: 15 minutes  Consulting other physicians critical care time: 15 minutes  Consult with family critical care time: 10 minutes  Total critical care time (exclusive of procedural time) : 85 minutes  Critical care time was exclusive of separately billable procedures and treating other patients.  Critical care was necessary to treat or prevent imminent or life-threatening deterioration of  the following conditions: cardiac failure.  Critical care was time spent personally by me on the following activities: discussions with consultants, evaluation of patient's response to treatment, obtaining history from patient or surrogate, ordering and review of laboratory studies, pulse oximetry, review of old charts, re-evaluation of patient's condition, ordering and review of radiographic studies, ordering and performing treatments and interventions, examination of patient and interpretation of cardiac output measurements.        Labs Reviewed   CBC W/ AUTO DIFFERENTIAL   COMPREHENSIVE METABOLIC PANEL   PROTIME-INR   APTT   TROPONIN I   B-TYPE NATRIURETIC PEPTIDE     EKG Readings: (Independently Interpreted)   Initial Reading: No STEMI. Previous EKG: Compared with most recent EKG Previous EKG Date: 3/31/19 (Minimal change). Rhythm: Normal Sinus Rhythm. Ectopy: No Ectopy. Conduction: LAFP (LVH with repolarization abnormality). T Waves Flipped: V2, V3 and V4. Axis: Normal.   Reviewed EKG with Dr. Suazo: Improved compared to patient's initial EKG at last admission, which has similar ST changes in V1-V5, but also had reciprocal changes that are absent in this EKG. EKG minimally changed from discharge EKG         X-Rays:   Independently Interpreted Readings:   Chest X-Ray: CXR Interpreted by radiologist and visualized by me     Imaging Results          X-Ray Chest AP Portable (Final result)  Result time 04/04/19 18:31:19    Final result by Miguel Grullon MD (04/04/19 18:31:19)                 Impression:      Stable examination findings of increased attenuation of the pulmonary interstitium right greater than left.  The findings may represent asymmetric pulmonary edema.  No focal consolidation.      Electronically signed by: Miguel Grullon MD  Date:    04/04/2019  Time:    18:31             Narrative:    EXAMINATION:  XR CHEST AP PORTABLE    CLINICAL HISTORY:  Chest pain, unspecified    TECHNIQUE:  Single frontal view  of the chest was performed.    COMPARISON:  03/29/2019.    FINDINGS:  Monitoring EKG leads are present.  The trachea is unremarkable.  The cardiomediastinal silhouette is within normal limits.  The hemidiaphragms are unremarkable.  There is no evidence of free air beneath the hemidiaphragms.  There are no pleural effusions.  There is no evidence of a pneumothorax.  There is no evidence of pneumomediastinum.  There is increased attenuation of the pulmonary interstitium more prominent in the right hemithorax.  No focal consolidation is present.  There are degenerative changes in the osseous structures.                                Medical Decision Making:   Initial Assessment:   69 y/o M presents to the ED c/o CP, SOB  Differential Diagnosis:   ACS, dissection, PNA, PTX, CHF, COPD  Independently Interpreted Test(s):   I have ordered and independently interpreted X-rays - see prior notes.  I have ordered and independently interpreted EKG Reading(s) - see prior notes  Clinical Tests:   Lab Tests: Reviewed       <> Summary of Lab: Elevated trop and BNP  ED Management:  Dr. Suazo arrives at bedside at 17:30    Agree with Dr. Suazo that the patient's EKG is more consistent with his post left heart catheterization and stent EKG.  I have discussed the case  extensively with family who agree with admission for further evaluation and conservative management. A DNR has been signed by the patient's medical power of . Patient was given 1 make per kg of Lovenox subcutaneously as well as 40 mg of IV Lasix per Dr. Suazo, and he and family report that he is doing better with no pain at this time.  I have placed courtesy admission orders.                   ED Course as of Apr 04 1731   Thu Apr 04, 2019   1722 Discussed the case with Dr. Suazo, who has reviewed the EKG from pre-hospital and on arrival. Informed him the patient is still complaining of chest pain, and he is coming to the ED now to see the patient.      [BB]      ED Course User Index  [BB] Kennedy Flower MD     Clinical Impression:       ICD-10-CM ICD-9-CM   1. Elevated troponin R74.8 790.6   2. Chest pain R07.9 786.50   3. NSTEMI (non-ST elevated myocardial infarction) I21.4 410.70               Disposition:   Disposition: Placed in Observation  Condition: Fair         I, Dr. Kennedy Flower, personally performed the services described in this documentation. All medical record entries made by the scribe were at my direction and in my presence.  I have reviewed the chart and agree that the record reflects my personal performance and is accurate and complete. Kennedy Flower MD.  8:04 PM 04/04/2019    Scribe Attestation               Kennedy Flower MD  04/04/19 2004

## 2019-04-04 NOTE — ED TRIAGE NOTES
PT arrived to ED via EMS for chest pain rated 10 out of 10. Per EMS, pt was released last week with 2 stents in place from a recent STEMI. Per EMS, pt received 3 nitro and 324 mg of Aspirin in route.

## 2019-04-05 PROBLEM — Z71.89 COUNSELING REGARDING ADVANCED CARE PLANNING AND GOALS OF CARE: Status: ACTIVE | Noted: 2019-04-05

## 2019-04-05 PROBLEM — I50.42 CHRONIC COMBINED SYSTOLIC AND DIASTOLIC HEART FAILURE: Status: ACTIVE | Noted: 2019-03-29

## 2019-04-05 PROBLEM — Z71.89 GOALS OF CARE, COUNSELING/DISCUSSION: Status: ACTIVE | Noted: 2019-04-05

## 2019-04-05 PROBLEM — Z51.5 PALLIATIVE CARE ENCOUNTER: Status: ACTIVE | Noted: 2019-04-05

## 2019-04-05 LAB
ANION GAP SERPL CALC-SCNC: 11 MMOL/L (ref 8–16)
ANISOCYTOSIS BLD QL SMEAR: ABNORMAL
BASOPHILS # BLD AUTO: ABNORMAL K/UL (ref 0–0.2)
BASOPHILS NFR BLD: 2 % (ref 0–1.9)
BUN SERPL-MCNC: 20 MG/DL (ref 8–23)
CALCIUM SERPL-MCNC: 9.4 MG/DL (ref 8.7–10.5)
CHLORIDE SERPL-SCNC: 106 MMOL/L (ref 95–110)
CO2 SERPL-SCNC: 23 MMOL/L (ref 23–29)
CREAT SERPL-MCNC: 0.9 MG/DL (ref 0.5–1.4)
DIFFERENTIAL METHOD: ABNORMAL
EOSINOPHIL # BLD AUTO: ABNORMAL K/UL (ref 0–0.5)
EOSINOPHIL NFR BLD: 14 % (ref 0–8)
ERYTHROCYTE [DISTWIDTH] IN BLOOD BY AUTOMATED COUNT: 16.4 % (ref 11.5–14.5)
EST. GFR  (AFRICAN AMERICAN): >60 ML/MIN/1.73 M^2
EST. GFR  (NON AFRICAN AMERICAN): >60 ML/MIN/1.73 M^2
GLUCOSE SERPL-MCNC: 100 MG/DL (ref 70–110)
HCT VFR BLD AUTO: 36.3 % (ref 40–54)
HGB BLD-MCNC: 10.9 G/DL (ref 14–18)
LYMPHOCYTES # BLD AUTO: ABNORMAL K/UL (ref 1–4.8)
LYMPHOCYTES NFR BLD: 8 % (ref 18–48)
MCH RBC QN AUTO: 31.7 PG (ref 27–31)
MCHC RBC AUTO-ENTMCNC: 30 G/DL (ref 32–36)
MCV RBC AUTO: 106 FL (ref 82–98)
MONOCYTES # BLD AUTO: ABNORMAL K/UL (ref 0.3–1)
MONOCYTES NFR BLD: 3 % (ref 4–15)
NEUTROPHILS NFR BLD: 73 % (ref 38–73)
PLATELET # BLD AUTO: 336 K/UL (ref 150–350)
PLATELET BLD QL SMEAR: ABNORMAL
PMV BLD AUTO: 11.9 FL (ref 9.2–12.9)
POTASSIUM SERPL-SCNC: 3.5 MMOL/L (ref 3.5–5.1)
RBC # BLD AUTO: 3.44 M/UL (ref 4.6–6.2)
SODIUM SERPL-SCNC: 140 MMOL/L (ref 136–145)
TROPONIN I SERPL DL<=0.01 NG/ML-MCNC: 1.19 NG/ML (ref 0–0.03)
WBC # BLD AUTO: 13.43 K/UL (ref 3.9–12.7)

## 2019-04-05 PROCEDURE — 85007 BL SMEAR W/DIFF WBC COUNT: CPT

## 2019-04-05 PROCEDURE — 80048 BASIC METABOLIC PNL TOTAL CA: CPT

## 2019-04-05 PROCEDURE — 85027 COMPLETE CBC AUTOMATED: CPT

## 2019-04-05 PROCEDURE — G0378 HOSPITAL OBSERVATION PER HR: HCPCS

## 2019-04-05 PROCEDURE — 27000221 HC OXYGEN, UP TO 24 HOURS

## 2019-04-05 PROCEDURE — 94761 N-INVAS EAR/PLS OXIMETRY MLT: CPT

## 2019-04-05 PROCEDURE — 36415 COLL VENOUS BLD VENIPUNCTURE: CPT

## 2019-04-05 PROCEDURE — 25000003 PHARM REV CODE 250: Performed by: EMERGENCY MEDICINE

## 2019-04-05 PROCEDURE — 84484 ASSAY OF TROPONIN QUANT: CPT

## 2019-04-05 PROCEDURE — 99205 OFFICE O/P NEW HI 60 MIN: CPT | Mod: ,,, | Performed by: NURSE PRACTITIONER

## 2019-04-05 PROCEDURE — 99205 PR OFFICE/OUTPT VISIT, NEW, LEVL V, 60-74 MIN: ICD-10-PCS | Mod: ,,, | Performed by: NURSE PRACTITIONER

## 2019-04-05 PROCEDURE — 25000003 PHARM REV CODE 250: Performed by: NURSE PRACTITIONER

## 2019-04-05 RX ORDER — ISOSORBIDE MONONITRATE 30 MG/1
30 TABLET, EXTENDED RELEASE ORAL DAILY
Status: DISCONTINUED | OUTPATIENT
Start: 2019-04-05 | End: 2019-04-05

## 2019-04-05 RX ORDER — ISOSORBIDE MONONITRATE 30 MG/1
30 TABLET, EXTENDED RELEASE ORAL DAILY
Qty: 30 TABLET | Refills: 11
Start: 2019-04-06 | End: 2020-04-05

## 2019-04-05 RX ORDER — QUETIAPINE FUMARATE 25 MG/1
25 TABLET, FILM COATED ORAL NIGHTLY
Status: DISCONTINUED | OUTPATIENT
Start: 2019-04-05 | End: 2019-04-06 | Stop reason: HOSPADM

## 2019-04-05 RX ORDER — ISOSORBIDE MONONITRATE 30 MG/1
30 TABLET, EXTENDED RELEASE ORAL DAILY
Status: DISCONTINUED | OUTPATIENT
Start: 2019-04-06 | End: 2019-04-06 | Stop reason: HOSPADM

## 2019-04-05 RX ADMIN — QUETIAPINE FUMARATE 12.5 MG: 25 TABLET, FILM COATED ORAL at 09:04

## 2019-04-05 RX ADMIN — QUETIAPINE FUMARATE 12.5 MG: 25 TABLET, FILM COATED ORAL at 05:04

## 2019-04-05 RX ADMIN — QUETIAPINE FUMARATE 25 MG: 25 TABLET ORAL at 08:04

## 2019-04-05 RX ADMIN — TICAGRELOR 90 MG: 90 TABLET ORAL at 09:04

## 2019-04-05 RX ADMIN — ASPIRIN 81 MG 81 MG: 81 TABLET ORAL at 09:04

## 2019-04-05 RX ADMIN — TICAGRELOR 90 MG: 90 TABLET ORAL at 08:04

## 2019-04-05 RX ADMIN — ATORVASTATIN CALCIUM 40 MG: 40 TABLET, FILM COATED ORAL at 09:04

## 2019-04-05 NOTE — ED NOTES
"Recd report assumed care. Pt has bed assignment and is observation. Pt lying on stretcher in NAD, oriented to name only. When asks if he is in pain he replies yes but when asked where, pt states "I dont know." [place] and son in law at bedside. DNR signed. Will continue to monitor. Will call report.  "

## 2019-04-05 NOTE — CONSULTS
"Consult Note  Palliative Care      Consult Requested By: Roland Suazo MD  Reason for Consult: Goals of care discussion/advance care planning    SUBJECTIVE:     History of Present Illness:  Disease Process: Advanced Cardiac Disease  Spoke to daughter earlier this week in regards to poor prognosis. Discussed with staff MD with family yesterday with discussion of code status  69yo male with history of STEMI, CAD s/p LAD CHUN, chronic systolic heart failure, tachycardia and dementia who presented to the ER from [facility] with complaints of chest pain. Mr. Ochoa was discharged earlier this week following admission for STEMI/CAD/CHF. He is demented making HPI difficult therefore it was obtained from the ER MD note. He was unable to described his pain and merely said it was significantly painful. EMS was called by the NH with complaints of SOB upon arrival of EMS with resolution upon arrival in the ER. He was given 3 SL NTG per EMS with initial BP in the 80s in route but improved in the ER        Palliative medicine spoke with family. Family decision to have hospice at home. [facility] in [place].   Family is aware of patient poor prognosis. His EFC is 25%. Daughter very tearful, "I don't want daddy to suffer." Notified Primary team and case management of family decision. Contact information given.     Past Medical History:   Diagnosis Date    Anemia     COPD (chronic obstructive pulmonary disease)     Dementia     GERD (gastroesophageal reflux disease)     Migraine headache     Neuropathy     Pancreatitis, chronic      Past Surgical History:   Procedure Laterality Date    ANGIOGRAM, CORONARY ARTERY N/A 3/27/2019    Performed by [facility]iel [facility] MD at [facility] CATH LAB/EP    BACK SURGERY      CATHETERIZATION, HEART, LEFT Left 3/27/2019    Performed by [facility]iel [facility] MD at [facility] CATH LAB/EP    Percutaneous coronary intervention N/A 3/27/2019    Performed by [facility]iel Perez MD at [facility] CATH LAB/EP    " Stent, Drug Eluting, Single Vessel, Coronary  3/27/2019    [facility] CATH LAB/EP    Ultrasound-coronary N/A 3/27/2019    Performed by [facility]iel [facility] at [facility] CATH LAB/EP     History reviewed. No pertinent family history.  Social History     Tobacco Use    Smoking status: Former Smoker   Substance Use Topics    Alcohol use: Not Currently    Drug use: Not on file         OBJECTIVE:     Pain Assessment: No pain reported at this time    Decision-Making Capacity: Patient answered questions, Patient unable to communicate due to disease severity/cognitive impairment    Advanced Directives:  Living Will: No  Do Not Resuscitate Status: No  Medical Power of : No  Registered Organ Donor: No    Living Arrangements: Lives in nursing home    Psychosocial, Spiritual, Cultural:  Patient's most important priorities:  none    Patient's biggest concerns/fears:  none    Previous death/end of life care history:  none    Patient's goals/hopes:  none    ASSESSMENT/PLAN:   Recommendations:  Continue medical treatment transition to comfort care  Code status: Partial Code  Family discharge plan home with hospice.        Thank you for  the opportunity to participate in Mr. Ochoa's care.         Deloris Demarco, MSN, APRN, NP-C   Palliative Medicine   [facility]  [phone] or [phone]          >50% of 120 min visit spent in chart review, face to face discussion of goals of care with patient, family, symptom assessment, coordination of care and emotional support.

## 2019-04-05 NOTE — CARE UPDATE
[facility]    FACILITY TRANSFER ORDERS      Patient Name: Micky Ochoa  YOB: 1950    PCP: Higinio Brush MD   PCP Address: [address] [place] [address] Lauryn LUND / Thomas MG  PCP Phone Number: [phone]  PCP Fax: [phone]    Encounter Date: 04/05/2019    Admit to: [facility]     Vital Signs:  Routine    Diagnoses:   Active Hospital Problems    Diagnosis  POA    *Chest pain [R07.9]  Yes    Palliative care encounter [Z51.5]  Not Applicable    Goals of care, counseling/discussion [Z71.89]  Not Applicable    Counseling regarding advanced care planning and goals of care [Z71.89]  Not Applicable    Chronic combined systolic and diastolic heart failure [I50.42]  Yes    Coronary artery disease involving native coronary artery [I25.10]  Yes      Resolved Hospital Problems   No resolved problems to display.       Allergies:  Review of patient's allergies indicates:   Allergen Reactions    Codeine        Diet: cardiac diet    Activities: Activity as tolerated      Medications: Review discharge medications with patient and family and provide education.      Current Discharge Medication List      START taking these medications    Details   isosorbide mononitrate (IMDUR) 30 MG 24 hr tablet Take 1 tablet (30 mg total) by mouth once daily.  Qty: 30 tablet, Refills: 11         CONTINUE these medications which have NOT CHANGED    Details   aspirin 81 MG Chew Take 1 tablet (81 mg total) by mouth once daily.  Refills: 0      atorvastatin (LIPITOR) 40 MG tablet Take 1 tablet (40 mg total) by mouth once daily.  Qty: 90 tablet, Refills: 3      fluticasone-vilanterol (BREO) 100-25 mcg/dose diskus inhaler Inhale 1 puff into the lungs once daily. Controller  Qty: 60 each, Refills: 0      furosemide (LASIX) 20 MG tablet Take 1 tablet (20 mg total) by mouth once daily.  Qty: 30 tablet, Refills: 11      lisinopril (PRINIVIL,ZESTRIL) 2.5 MG tablet Take 1 tablet (2.5 mg total) by mouth once  daily.  Qty: 90 tablet, Refills: 3      metoprolol succinate (TOPROL-XL) 25 MG 24 hr tablet Take 1/2 tablets (12.5 mg total) by mouth once daily.  Qty: 15 tablet, Refills: 11      nitroGLYCERIN (NITROSTAT) 0.4 MG SL tablet Place 1 tablet (0.4 mg total) under the tongue every 5 (five) minutes as needed for Chest pain (If chest pain is unrelieved by 3 doses, call MD).  Qty: 25 tablet, Refills: 11      !! QUEtiapine (SEROQUEL) 25 MG Tab 12.5mg po daily  Qty: 30 tablet, Refills: 11      !! QUEtiapine (SEROQUEL) 25 MG Tab 12.52mg po before dinner  Qty: 30 tablet, Refills: 11      !! QUEtiapine (SEROQUEL) 25 MG Tab Take 1 tablet (25 mg total) by mouth every evening.  Qty: 30 tablet, Refills: 11      ticagrelor (BRILINTA) 90 mg tablet Take 1 tablet (90 mg total) by mouth 2 (two) times daily.  Qty: 60 tablet, Refills: 11      tiotropium (SPIRIVA) 18 mcg inhalation capsule Inhale 1 capsule (18 mcg total) into the lungs once daily. Controller  Qty: 30 capsule, Refills: 0       !! - Potential duplicate medications found. Please discuss with provider.               _________________________________  HUSAM West, ANP  04/05/2019

## 2019-04-05 NOTE — PLAN OF CARE
Problem: Adult Inpatient Plan of Care  Goal: Plan of Care Review  Outcome: Ongoing (interventions implemented as appropriate)  Plan of care reviewed with patient and daughters. Patient and family all verbalized their complete understanding. Patient will be place on Hospice at home. Fall precautions maintained this shift. Bed in lowest position, locked, call light within reach, and slip resistant socks on. HOLLIE in place. Nurse instructed patient to notify staff for any assistance and patient verbalized complete understanding. Patient removed telemetry monitor throughout shift. Patient also removed his I this shift. Will cont to monitor.

## 2019-04-05 NOTE — HPI
67yo male with history of STEMI, CAD s/p LAD CHUN, chronic systolic heart failure, tachycardia and dementia who presented to the ER from [facility] with complaints of chest pain. Mr. Ochoa was discharged earlier this week following admission for STEMI/CAD/CHF. He is demented making HPI difficult therefore it was obtained from the ER MD note. He was unable to described his pain and merely said it was significantly painful. EMS was called by the NH with complaints of SOB upon arrival of EMS with resolution upon arrival in the ER. He was given 3 SL NTG per EMS with initial BP in the 80s in route but improved in the ER.

## 2019-04-05 NOTE — PLAN OF CARE
04/05/19 4468   Post-Acute Status   Post-Acute Authorization Home Health/Hospice   Home Health/Hospice Status Set-up Complete

## 2019-04-05 NOTE — PLAN OF CARE
Patient is Alert but has dementia  [facility] resident of dementia unit of [facility]    Palliative Care consult pending       04/05/19 1028   Discharge Assessment   Assessment Type Discharge Planning Assessment   Confirmed/corrected address and phone number on facesheet? Yes   Assessment information obtained from? Medical Record   Prior to hospitilization cognitive status: Not Oriented to Place;Not Oriented to Time   Prior to hospitalization functional status: Needs Assistance;Partially Dependent   Current cognitive status: Not Oriented to Place;Not Oriented to Time   Current Functional Status: Needs Assistance   Lives With facility resident  ([facility])   Able to Return to Prior Arrangements yes   Is patient able to care for self after discharge? No   Patient's perception of discharge disposition home or selfcare   Readmission Within the Last 30 Days other (see comments)   Patient currently being followed by outpatient case management? No   Patient currently receives any other outside agency services? No   Equipment Currently Used at Home wheelchair;cane, straight   Do you have any problems affording any of your prescribed medications? No   Is the patient taking medications as prescribed? yes   Does the patient have transportation home? Yes   Transportation Anticipated agency   Does the patient receive services at the [facility]? No   Discharge Plan A Home   Discharge Plan B Home   DME Needed Upon Discharge  none   Patient/Family in Agreement with Plan yes     Payton Matthew, RN, CCM, CMSRN  RN Transition Navigator  [phone]

## 2019-04-05 NOTE — PLAN OF CARE
Patient's family home hospice preference is [facility], sent information via [employer] and called them. They received information via [employer] and will call daughter Shannen [phone] to set up hospice meeting to sign paperwork. Anticipate discharge to home hospice for tomorrow.       04/05/19 1449   Post-Acute Status   Post-Acute Authorization Home Health/Hospice   Home Health/Hospice Status Referrals Sent     Payton Matthew RN, CCM, CMSRN  RN Transition Navigator  [phone]

## 2019-04-05 NOTE — HOSPITAL COURSE
4/4/2019 Presented to the ER with complaints of chest pain and SOB. Initial troponin 1.271 but lower than previous admission 15-16. EKG NSR LAD anteroseptal infarct and JERMAINE to anterior leads similar to previous EKG. K+ 3.5 and creatinine .9. Admitted to [facility] for further observation   4/5/2019 Troponin trended down this AM to 1.190. HR stable. BP marginal. Resting comfortably with no acute distress. Conversation yesterday evening with family per staff with decision for partial DNR. Palliative care consulted to discuss goals of care with family/patient-overall prognosis very poor.

## 2019-04-05 NOTE — ASSESSMENT & PLAN NOTE
-complaints of chest pain prompting presentation to the ER  -initial troponin 1.271 with continued trend down from recent STEMI with peak at 15-16; repeat troponin this AM 1.190  -EKG with LAD, anteroseptal infarct and JERMAINE to anterior leads unchanged from previous and likely due to aneurysmal changes of LV  -no suspicion of abrupt stent occlusion currently  -will continue DAPT, statin therapy and BB; BP marginal and will hold ACEI and start Imdur in attempts to prevent recurrent episodes of chest pain

## 2019-04-05 NOTE — PLAN OF CARE
Problem: Adult Inpatient Plan of Care  Goal: Plan of Care Review  Outcome: Ongoing (interventions implemented as appropriate)  Patient stable VS over the night, afebrile. Free from fall. Saline lock removed by the patient around 0530H, went to the toilet, unable to use urinal. Re-inserted gauge 22 saline lock right hand. Telemetry totally removed by the patient around 2215H.98% on Room air. Free from chest pain.Will endorse patient to day shift Nurse.

## 2019-04-05 NOTE — PLAN OF CARE
Patient's family met with [facility] and plan is to discharge to home hospice tomorrow (Saturday)  Hospice equipment is being delivered tonight to daughter's home and daughter will  patient tomorrow via car.     ANNA Rick NP will write discharge orders.    Family will transport patient to home on Saturday.    Discharge orders sent to [facility] via [facility].       04/05/19 3927   Discharge Reassessment   Assessment Type Discharge Planning Reassessment   Provided patient/caregiver education on the expected discharge date and the discharge plan Yes   Discharge Plan A Home;Home with family;Hospice/home     Payton Matthew, RN, CCM, CMSRN  RN Transition Navigator  [phone]

## 2019-04-05 NOTE — PLAN OF CARE
VN rounds: VN cued into pt's room. Pt appears asleep. Telesitter in room monitoring pt. NAD noted. Will cont to be available and intervene as needed.        04/05/19 1022   Type of Frequent Check   Type Patient Rounds;Other (see comments)  (VN rounds)   Safety/Activity   Patient Rounds visualized patient;call light in patient/parent reach   Safety Promotion/Fall Prevention assistive device/personal item within reach;/camera at bedside   Positioning   Body Position side-lying, left   Pain/Comfort/Sleep   Preferred Pain Scale FACES (Burnham-Pugh FACES Pain Rating Scale)   FACES Pain Rating: Rest 0-->no hurt   Sleep/Rest/Relaxation appears asleep

## 2019-04-05 NOTE — ASSESSMENT & PLAN NOTE
-recent echo with severely depressed LV function with EF 25% and diastolic dysfunction  -BNP 2243 not significantly different than previous BNP  -CXR with asymmetric pulmonary edema with improvement in comparison to previous CXR  -given IV Lasix in the ER with no urine output documented  -lying flat with no acute respiratory distress; previously considered low dose Lasix but apprehensive given dementia, poor po intake and risk for dehydration

## 2019-04-05 NOTE — PLAN OF CARE
Rep from [facility] on way to meet with family to sign hospice paperwork.       04/05/19 1529   Post-Acute Status   Post-Acute Authorization Home Health/Hospice   Home Health/Hospice Status Pending Service Contract     Payton Matthew RN, CCM, CMSRN  RN Transition Navigator  [phone]

## 2019-04-05 NOTE — SUBJECTIVE & OBJECTIVE
Past Medical History:   Diagnosis Date    Anemia     COPD (chronic obstructive pulmonary disease)     Dementia     GERD (gastroesophageal reflux disease)     Migraine headache     Neuropathy     Pancreatitis, chronic        Past Surgical History:   Procedure Laterality Date    ANGIOGRAM, CORONARY ARTERY N/A 3/27/2019    [facility]formed [facility] CATH LAB/EP    BACK SURGERY      CATHETERIZATION, HEART, LEFT Left 3/27/2019    [facility] by [facility] at [facility] CATH LAB/EP    Percutaneous coronary intervention N/A 3/27/2019    Per[facility] by [facility] at [facility] CATH LAB/EP    Stent, Drug Eluting, Single Vessel, Coronary  3/27/2019    Performed by [facility] at [facility] CATH LAB/EP    Ultrasound-coronary N/A 3/27/2019    Performed by [facility]iel Perez [facility] at [facility] CATH LAB/EP       Review of patient's allergies indicates:   Allergen Reactions    Codeine        No current facility-administered medications on file prior to encounter.      Current Outpatient Medications on File Prior to Encounter   Medication Sig    aspirin 81 MG Chew Take 1 tablet (81 mg total) by mouth once daily.    atorvastatin (LIPITOR) 40 MG tablet Take 1 tablet (40 mg total) by mouth once daily.    fluticasone-vilanterol (BREO) 100-25 mcg/dose diskus inhaler Inhale 1 puff into the lungs once daily. Controller    furosemide (LASIX) 20 MG tablet Take 1 tablet (20 mg total) by mouth once daily.    lisinopril (PRINIVIL,ZESTRIL) 2.5 MG tablet Take 1 tablet (2.5 mg total) by mouth once daily.    metoprolol succinate (TOPROL-XL) 25 MG 24 hr tablet Take 1/2 tablets (12.5 mg total) by mouth once daily.    nitroGLYCERIN (NITROSTAT) 0.4 MG SL tablet Place 1 tablet (0.4 mg total) under the tongue every 5 (five) minutes as needed for Chest pain (If chest pain is unrelieved by 3 doses, call MD).    QUEtiapine (SEROQUEL) 25 MG Tab 12.5mg po daily    QUEtiapine (SEROQUEL) 25 MG Tab 12.52mg po before dinner     QUEtiapine (SEROQUEL) 25 MG Tab Take 1 tablet (25 mg total) by mouth every evening.    ticagrelor (BRILINTA) 90 mg tablet Take 1 tablet (90 mg total) by mouth 2 (two) times daily.    tiotropium (SPIRIVA) 18 mcg inhalation capsule Inhale 1 capsule (18 mcg total) into the lungs once daily. Controller     Family History     None        Tobacco Use    Smoking status: Former Smoker   Substance and Sexual Activity    Alcohol use: Not Currently    Drug use: Not on file    Sexual activity: Not on file     Review of Systems   Unable to perform ROS: dementia     Objective:     Vital Signs (Most Recent):  Temp: 98.9 °F (37.2 °C) (04/05/19 1205)  Pulse: 101 (04/05/19 1205)  Resp: (!) 22 (04/05/19 1205)  BP: (!) 109/58 (04/05/19 1205)  SpO2: 98 % (04/05/19 1205) Vital Signs (24h Range):  Temp:  [98 °F (36.7 °C)-99.5 °F (37.5 °C)] 98.9 °F (37.2 °C)  Pulse:  [] 101  Resp:  [17-35] 22  SpO2:  [90 %-98 %] 98 %  BP: ()/(51-62) 109/58     Weight: 62 kg (136 lb 11 oz)  Body mass index is 20.18 kg/m².    SpO2: 98 %  O2 Device (Oxygen Therapy): room air      Intake/Output Summary (Last 24 hours) at 4/5/2019 1410  Last data filed at 4/5/2019 0800  Gross per 24 hour   Intake 325 ml   Output --   Net 325 ml       Lines/Drains/Airways     Peripheral Intravenous Line                 Peripheral IV - Single Lumen 04/05/19 0540 Anterior;Right Hand less than 1 day                Physical Exam   Constitutional: He has a sickly appearance. He appears ill.   Cardiovascular: Normal rate and regular rhythm. Exam reveals no gallop.   No murmur heard.  Pulmonary/Chest: No tachypnea. No respiratory distress. He has decreased breath sounds.   Neurological:   Resting comfortably in bed; arousable but confused        Significant Labs:     Recent Labs   Lab 04/05/19  0455   WBC 13.43*   RBC 3.44*   HGB 10.9*   HCT 36.3*      *   MCH 31.7*   MCHC 30.0*     Recent Labs   Lab 04/04/19  1739 04/05/19  0455   CALCIUM 8.9 9.4   PROT  7.8  --     140   K 4.5 3.5   CO2 22* 23    106   BUN 21 20   CREATININE 0.9 0.9   ALKPHOS 65  --    ALT 16  --    AST 36  --    BILITOT 0.6  --      Recent Labs   Lab 04/05/19  0455   TROPONINI 1.190*       Significant Imaging:     TTE 3/28/2019    · Normal right ventricular systolic function.  · Severely decreased left ventricular systolic function. The estimated ejection fraction is 25%  · Left ventricular diastolic dysfunction.  · Severe left atrial enlargement.  · Normal central venous pressure (3 mm Hg).  · The estimated PA systolic pressure is 25 mm Hg

## 2019-04-05 NOTE — H&P
[facility]  Cardiology  History and Physical     Patient Name: Micky Ochoa  MRN: [id number]  Admission Date: 4/4/2019  Code Status: Partial Code   Attending Provider: Roland Suazo MD   Primary Care Physician: Higinio Brush MD  Principal Problem:Chest pain    Patient information was obtained from past medical records and ER records.     Subjective:     Chief Complaint:  Chest pain and SOB      HPI:  67yo male with history of STEMI, CAD s/p LAD CHUN, chronic systolic heart failure, tachycardia and dementia who presented to the ER from [facility] with complaints of chest pain. Mr. Ochoa was discharged earlier this week following admission for STEMI/CAD/CHF. He is demented making HPI difficult therefore it was obtained from the ER MD note. He was unable to described his pain and merely said it was significantly painful. EMS was called by the NH with complaints of SOB upon arrival of EMS with resolution upon arrival in the ER. He was given 3 SL NTG per EMS with initial BP in the 80s in route but improved in the ER.      Hospital Course:    4/4/2019 Presented to the ER with complaints of chest pain and SOB. Initial troponin 1.271 but lower than previous admission 15-16. EKG NSR LAD anteroseptal infarct and JERMAINE to anterior leads similar to previous EKG. K+ 3.5 and creatinine .9. Admitted to [facility] for further observation   4/5/2019 Troponin trended down this AM to 1.190. HR stable. BP marginal. Resting comfortably with no acute distress. Conversation yesterday evening with family per staff with decision for partial DNR. Palliative care consulted to discuss goals of care with family/patient-overall prognosis very poor.       Past Medical History:   Diagnosis Date    Anemia     COPD (chronic obstructive pulmonary disease)     Dementia     GERD (gastroesophageal reflux disease)     Migraine headache     Neuropathy     Pancreatitis, chronic        Past Surgical History:    Procedure Laterality Date    ANGIOGRAM, CORONARY ARTERY N/A 3/27/2019    [facility]formed by [facility] at [facility] CATH LAB/EP    BACK SURGERY      CATHETERIZATION, HEART, LEFT Left 3/27/2019    [facility] by [facility] at [facility] CATH LAB/EP    Percutaneous coronary intervention N/A 3/27/2019    Performed by [facility] at [facility] CATH LAB/EP    Stent, Drug Eluting, Single Vessel, Coronary  3/27/2019    Performed by [facility]iel [facility] at [facility] CATH LAB/EP    Ultrasound-coronary N/A 3/27/2019    Performed by Abdiel Perez MD at [facility] CATH LAB/EP       Review of patient's allergies indicates:   Allergen Reactions    Codeine        No current facility-administered medications on file prior to encounter.      Current Outpatient Medications on File Prior to Encounter   Medication Sig    aspirin 81 MG Chew Take 1 tablet (81 mg total) by mouth once daily.    atorvastatin (LIPITOR) 40 MG tablet Take 1 tablet (40 mg total) by mouth once daily.    fluticasone-vilanterol (BREO) 100-25 mcg/dose diskus inhaler Inhale 1 puff into the lungs once daily. Controller    furosemide (LASIX) 20 MG tablet Take 1 tablet (20 mg total) by mouth once daily.    lisinopril (PRINIVIL,ZESTRIL) 2.5 MG tablet Take 1 tablet (2.5 mg total) by mouth once daily.    metoprolol succinate (TOPROL-XL) 25 MG 24 hr tablet Take 1/2 tablets (12.5 mg total) by mouth once daily.    nitroGLYCERIN (NITROSTAT) 0.4 MG SL tablet Place 1 tablet (0.4 mg total) under the tongue every 5 (five) minutes as needed for Chest pain (If chest pain is unrelieved by 3 doses, call MD).    QUEtiapine (SEROQUEL) 25 MG Tab 12.5mg po daily    QUEtiapine (SEROQUEL) 25 MG Tab 12.52mg po before dinner    QUEtiapine (SEROQUEL) 25 MG Tab Take 1 tablet (25 mg total) by mouth every evening.    ticagrelor (BRILINTA) 90 mg tablet Take 1 tablet (90 mg total) by mouth 2 (two) times daily.    tiotropium (SPIRIVA) 18 mcg inhalation capsule Inhale 1 capsule (18 mcg  total) into the lungs once daily. Controller     Family History     None        Tobacco Use    Smoking status: Former Smoker   Substance and Sexual Activity    Alcohol use: Not Currently    Drug use: Not on file    Sexual activity: Not on file     Review of Systems   Unable to perform ROS: dementia     Objective:     Vital Signs (Most Recent):  Temp: 98.9 °F (37.2 °C) (04/05/19 1205)  Pulse: 101 (04/05/19 1205)  Resp: (!) 22 (04/05/19 1205)  BP: (!) 109/58 (04/05/19 1205)  SpO2: 98 % (04/05/19 1205) Vital Signs (24h Range):  Temp:  [98 °F (36.7 °C)-99.5 °F (37.5 °C)] 98.9 °F (37.2 °C)  Pulse:  [] 101  Resp:  [17-35] 22  SpO2:  [90 %-98 %] 98 %  BP: ()/(51-62) 109/58     Weight: 62 kg (136 lb 11 oz)  Body mass index is 20.18 kg/m².    SpO2: 98 %  O2 Device (Oxygen Therapy): room air      Intake/Output Summary (Last 24 hours) at 4/5/2019 1410  Last data filed at 4/5/2019 0800  Gross per 24 hour   Intake 325 ml   Output --   Net 325 ml       Lines/Drains/Airways     Peripheral Intravenous Line                 Peripheral IV - Single Lumen 04/05/19 0540 Anterior;Right Hand less than 1 day                Physical Exam   Constitutional: He has a sickly appearance. He appears ill.   Cardiovascular: Normal rate and regular rhythm. Exam reveals no gallop.   No murmur heard.  Pulmonary/Chest: No tachypnea. No respiratory distress. He has decreased breath sounds.   Neurological:   Resting comfortably in bed; arousable but confused        Significant Labs:     Recent Labs   Lab 04/05/19  0455   WBC 13.43*   RBC 3.44*   HGB 10.9*   HCT 36.3*      *   MCH 31.7*   MCHC 30.0*     Recent Labs   Lab 04/04/19  1739 04/05/19  0455   CALCIUM 8.9 9.4   PROT 7.8  --     140   K 4.5 3.5   CO2 22* 23    106   BUN 21 20   CREATININE 0.9 0.9   ALKPHOS 65  --    ALT 16  --    AST 36  --    BILITOT 0.6  --      Recent Labs   Lab 04/05/19  0455   TROPONINI 1.190*       Significant Imaging:     TTE  3/28/2019    · Normal right ventricular systolic function.  · Severely decreased left ventricular systolic function. The estimated ejection fraction is 25%  · Left ventricular diastolic dysfunction.  · Severe left atrial enlargement.  · Normal central venous pressure (3 mm Hg).  · The estimated PA systolic pressure is 25 mm Hg    Assessment and Plan:     * Chest pain  -complaints of chest pain prompting presentation to the ER  -initial troponin 1.271 with continued trend down from recent STEMI with peak at 15-16; repeat troponin this AM 1.190  -EKG with LAD, anteroseptal infarct and JERMAINE to anterior leads unchanged from previous and likely due to aneurysmal changes of LV  -no suspicion of abrupt stent occlusion currently  -will continue DAPT, statin therapy and BB; BP marginal and will hold ACEI and start Imdur in attempts to prevent recurrent episodes of chest pain     Chronic combined systolic and diastolic heart failure  -recent echo with severely depressed LV function with EF 25% and diastolic dysfunction  -BNP 2243 not significantly different than previous BNP  -CXR with asymmetric pulmonary edema with improvement in comparison to previous CXR  -given IV Lasix in the ER with no urine output documented  -lying flat with no acute respiratory distress; previously considered low dose Lasix but apprehensive given dementia, poor po intake and risk for dehydration    Coronary artery disease involving native coronary artery  -s/p STEMI with LAD CHUN 3/2019  -continue GDMT  -overall prognosis poor with CAD/STEMI, CHF, dementia  -DNR currently; Palliative care consult for family discussion         VTE Risk Mitigation (From admission, onward)        Ordered     IP VTE HIGH RISK PATIENT  Once      04/04/19 2047     Place sequential compression device  Until discontinued      04/04/19 2047          HUSAM West, ANP  Cardiology   [facility]

## 2019-04-05 NOTE — ASSESSMENT & PLAN NOTE
-s/p STEMI with LAD CHUN 3/2019  -continue GDMT  -overall prognosis poor with CAD/STEMI, CHF, dementia  -DNR currently; Palliative care consult for family discussion

## 2019-04-06 VITALS
HEART RATE: 96 BPM | RESPIRATION RATE: 19 BRPM | DIASTOLIC BLOOD PRESSURE: 61 MMHG | SYSTOLIC BLOOD PRESSURE: 115 MMHG | WEIGHT: 136.25 LBS | OXYGEN SATURATION: 96 % | HEIGHT: 69 IN | TEMPERATURE: 97 F | BODY MASS INDEX: 20.18 KG/M2

## 2019-04-06 LAB
ANION GAP SERPL CALC-SCNC: 11 MMOL/L (ref 8–16)
ANISOCYTOSIS BLD QL SMEAR: SLIGHT
BASOPHILS NFR BLD: 0 % (ref 0–1.9)
BUN SERPL-MCNC: 13 MG/DL (ref 8–23)
CALCIUM SERPL-MCNC: 9 MG/DL (ref 8.7–10.5)
CHLORIDE SERPL-SCNC: 105 MMOL/L (ref 95–110)
CO2 SERPL-SCNC: 22 MMOL/L (ref 23–29)
CREAT SERPL-MCNC: 0.8 MG/DL (ref 0.5–1.4)
DACRYOCYTES BLD QL SMEAR: ABNORMAL
DIFFERENTIAL METHOD: ABNORMAL
EOSINOPHIL NFR BLD: 11 % (ref 0–8)
ERYTHROCYTE [DISTWIDTH] IN BLOOD BY AUTOMATED COUNT: 16.5 % (ref 11.5–14.5)
EST. GFR  (AFRICAN AMERICAN): >60 ML/MIN/1.73 M^2
EST. GFR  (NON AFRICAN AMERICAN): >60 ML/MIN/1.73 M^2
GLUCOSE SERPL-MCNC: 108 MG/DL (ref 70–110)
HCT VFR BLD AUTO: 34.5 % (ref 40–54)
HGB BLD-MCNC: 10.5 G/DL (ref 14–18)
HYPOCHROMIA BLD QL SMEAR: ABNORMAL
LYMPHOCYTES NFR BLD: 15 % (ref 18–48)
MCH RBC QN AUTO: 32.1 PG (ref 27–31)
MCHC RBC AUTO-ENTMCNC: 30.4 G/DL (ref 32–36)
MCV RBC AUTO: 106 FL (ref 82–98)
MONOCYTES NFR BLD: 7 % (ref 4–15)
NEUTROPHILS NFR BLD: 66 % (ref 38–73)
NEUTS BAND NFR BLD MANUAL: 1 %
PLATELET # BLD AUTO: 337 K/UL (ref 150–350)
PLATELET BLD QL SMEAR: ABNORMAL
PMV BLD AUTO: 11.4 FL (ref 9.2–12.9)
POIKILOCYTOSIS BLD QL SMEAR: SLIGHT
POLYCHROMASIA BLD QL SMEAR: ABNORMAL
POTASSIUM SERPL-SCNC: 3.4 MMOL/L (ref 3.5–5.1)
RBC # BLD AUTO: 3.27 M/UL (ref 4.6–6.2)
SODIUM SERPL-SCNC: 138 MMOL/L (ref 136–145)
WBC # BLD AUTO: 12.9 K/UL (ref 3.9–12.7)

## 2019-04-06 PROCEDURE — 80048 BASIC METABOLIC PNL TOTAL CA: CPT

## 2019-04-06 PROCEDURE — 25000003 PHARM REV CODE 250: Performed by: EMERGENCY MEDICINE

## 2019-04-06 PROCEDURE — 85007 BL SMEAR W/DIFF WBC COUNT: CPT

## 2019-04-06 PROCEDURE — 99217 PR OBSERVATION CARE DISCHARGE: CPT | Mod: ,,, | Performed by: INTERNAL MEDICINE

## 2019-04-06 PROCEDURE — 94761 N-INVAS EAR/PLS OXIMETRY MLT: CPT

## 2019-04-06 PROCEDURE — 85027 COMPLETE CBC AUTOMATED: CPT

## 2019-04-06 PROCEDURE — 36415 COLL VENOUS BLD VENIPUNCTURE: CPT

## 2019-04-06 PROCEDURE — 99217 PR OBSERVATION CARE DISCHARGE: ICD-10-PCS | Mod: ,,, | Performed by: INTERNAL MEDICINE

## 2019-04-06 PROCEDURE — 25000003 PHARM REV CODE 250: Performed by: NURSE PRACTITIONER

## 2019-04-06 PROCEDURE — G0378 HOSPITAL OBSERVATION PER HR: HCPCS

## 2019-04-06 RX ORDER — TIOTROPIUM BROMIDE 18 UG/1
1 CAPSULE ORAL; RESPIRATORY (INHALATION) DAILY
Qty: 30 CAPSULE | Refills: 0
Start: 2019-04-06 | End: 2020-04-05

## 2019-04-06 RX ORDER — FUROSEMIDE 20 MG/1
20 TABLET ORAL DAILY
Qty: 30 TABLET | Refills: 11 | Status: SHIPPED | OUTPATIENT
Start: 2019-04-06 | End: 2020-04-05

## 2019-04-06 RX ORDER — METOPROLOL SUCCINATE 25 MG/1
12.5 TABLET, EXTENDED RELEASE ORAL DAILY
Qty: 15 TABLET | Refills: 11 | Status: SHIPPED | OUTPATIENT
Start: 2019-04-06 | End: 2020-04-05

## 2019-04-06 RX ORDER — ATORVASTATIN CALCIUM 40 MG/1
40 TABLET, FILM COATED ORAL DAILY
Qty: 90 TABLET | Refills: 3
Start: 2019-04-06 | End: 2020-04-05

## 2019-04-06 RX ORDER — NAPROXEN SODIUM 220 MG/1
81 TABLET, FILM COATED ORAL DAILY
Refills: 0 | COMMUNITY
Start: 2019-04-06 | End: 2020-04-05

## 2019-04-06 RX ORDER — LISINOPRIL 2.5 MG/1
2.5 TABLET ORAL DAILY
Qty: 90 TABLET | Refills: 3
Start: 2019-04-06 | End: 2020-04-05

## 2019-04-06 RX ADMIN — QUETIAPINE FUMARATE 12.5 MG: 25 TABLET, FILM COATED ORAL at 08:04

## 2019-04-06 RX ADMIN — ACETAMINOPHEN 650 MG: 325 TABLET ORAL at 06:04

## 2019-04-06 RX ADMIN — ATORVASTATIN CALCIUM 40 MG: 40 TABLET, FILM COATED ORAL at 08:04

## 2019-04-06 RX ADMIN — ASPIRIN 81 MG 81 MG: 81 TABLET ORAL at 08:04

## 2019-04-06 RX ADMIN — TICAGRELOR 90 MG: 90 TABLET ORAL at 08:04

## 2019-04-06 NOTE — PROGRESS NOTES
[facility]  Cardiology  History and Physical     Patient Name: Micky Ochoa  MRN: [id number]  Admission Date: 4/4/2019  Code Status: Partial Code   Attending Provider: Roland Suazo MD   Primary Care Physician: Higinio Brush MD  Principal Problem:Chest pain    Patient information was obtained from past medical records and ER records.     Subjective:     Chief Complaint:  Chest pain and SOB      HPI:  69yo male with history of STEMI, CAD s/p LAD CHUN, chronic systolic heart failure, tachycardia and dementia who presented to the ER from [facility] with complaints of chest pain. Mr. Ochoa was discharged earlier this week following admission for STEMI/CAD/CHF. He is demented making HPI difficult therefore it was obtained from the ER MD note. He was unable to described his pain and merely said it was significantly painful. EMS was called by the NH with complaints of SOB upon arrival of EMS with resolution upon arrival in the ER. He was given 3 SL NTG per EMS with initial BP in the 80s in route but improved in the ER.      Hospital Course:    4/4/2019 Presented to the ER with complaints of chest pain and SOB. Initial troponin 1.271 but lower than previous admission 15-16. EKG NSR LAD anteroseptal infarct and JERMAINE to anterior leads similar to previous EKG. K+ 3.5 and creatinine .9. Admitted to [facility] for further observation   4/5/2019 Troponin trended down this AM to 1.190. HR stable. BP marginal. Resting comfortably with no acute distress. Conversation yesterday evening with family per staff with decision for partial DNR. Palliative care consulted to discuss goals of care with family/patient-overall prognosis very poor.   4/6/2019  Overnight no acute events. BP marginal this AM and will hold anti-hypertensives. No new symptoms. Plan for D/C to hospice today.     Review of Systems   Unable to perform ROS: dementia          Objective:     Vitals:    04/06/19 0420 04/06/19 0450 04/06/19  0750 04/06/19 0909   BP: (!) 96/54  (!) 97/51    BP Location:       Patient Position: Lying      Pulse: 96  102    Resp: 18  18    Temp: 99.4 °F (37.4 °C)  98.9 °F (37.2 °C)    TempSrc: Oral      SpO2: 96% 95%  96%   Weight: 61.8 kg (136 lb 3.9 oz)      Height:         Intake/Output - Last 3 Shifts       04/04 0700 - 04/05 0659 04/05 0700 - 04/06 0659 04/06 0700 - 04/07 0659    P.O. 200 795 125    Total Intake(mL/kg) 200 (3.2) 795 (12.9) 125 (2)    Net +200 +795 +125           Urine Occurrence 2 x 3 x         Recent Labs     04/06/19  0434   HGB 10.5*   HCT 34.5*         Recent Labs     04/06/19  0434   K 3.4*   BUN 13   CREATININE 0.8         Significant Imaging:      TTE 3/28/2019     · Normal right ventricular systolic function.  · Severely decreased left ventricular systolic function. The estimated ejection fraction is 25%  · Left ventricular diastolic dysfunction.  · Severe left atrial enlargement.  · Normal central venous pressure (3 mm Hg).  · The estimated PA systolic pressure is 25 mm Hg            Assessment and Plan:     * Chest pain  -complaints of chest pain prompting presentation to the ER  -initial troponin 1.271 with continued trend down from recent STEMI with peak at 15-16; repeat troponin this 1.190  -EKG with LAD, anteroseptal infarct and JERMAINE to anterior leads unchanged from previous and likely due to aneurysmal changes of LV  -no suspicion of abrupt stent occlusion currently  -will continue DAPT, statin therapy and BB; BP marginal and will hold ACEI and start Imdur in attempts to prevent recurrent episodes of chest pain - held this AM for marginal BP    Chronic combined systolic and diastolic heart failure  -recent echo with severely depressed LV function with EF 25% and diastolic dysfunction  -BNP 2243 not significantly different than previous BNP  -CXR with asymmetric pulmonary edema with improvement in comparison to previous CXR  -given IV Lasix in the ER with no urine output  documented  -lying flat with no acute respiratory distress; previously considered low dose Lasix but apprehensive given dementia, poor po intake and risk for dehydration    Coronary artery disease involving native coronary artery  -s/p STEMI with LAD CHUN 3/2019  -continue GDMT  -overall prognosis poor with CAD/STEMI, CHF, dementia  -DNR currently; Hospice today      VTE Risk Mitigation (From admission, onward)        Ordered     IP VTE HIGH RISK PATIENT  Once      04/04/19 2047     Place sequential compression device  Until discontinued      04/04/19 2047          Mj Schneider MD  Cardiology   [facility]

## 2019-04-06 NOTE — PLAN OF CARE
Problem: Adult Inpatient Plan of Care  Goal: Plan of Care Review  Pt on RA, no respiratory distress noted. Will continue to monitor.

## 2019-04-06 NOTE — PLAN OF CARE
Problem: Adult Inpatient Plan of Care  Goal: Plan of Care Review  Outcome: Ongoing (interventions implemented as appropriate)  Patient stable VS over the night, afebrile. Free from fall. 98% on Room air. Slept well over the night. Free from chest pain.With noted rashes on his both forearm and elbow, will endorse to day shift nurse.

## 2019-04-06 NOTE — DISCHARGE SUMMARY
[facility]  Discharge Summary      Admit Date: 4/4/2019    Discharge Date and Time:  04/06/2019 11:02 AM    Attending Physician: Mj Schneider MD    Reason for Admission: Chest pain    Procedures Performed: * No surgery found *    Hospital Course (synopsis of major diagnoses, care, treatment, and services provided during the course of the hospital stay): Patient admitted for CP, recent STEMI. Demented and goals of care discussed with family and pt DNR and will d/c to hospice.        Final Diagnoses:    Principal Problem: Chest pain   Secondary Diagnoses:   Active Hospital Problems    Diagnosis  POA    *Chest pain [R07.9]  Yes    Palliative care encounter [Z51.5]  Not Applicable    Goals of care, counseling/discussion [Z71.89]  Not Applicable    Counseling regarding advanced care planning and goals of care [Z71.89]  Not Applicable    Chronic combined systolic and diastolic heart failure [I50.42]  Yes    Coronary artery disease involving native coronary artery [I25.10]  Yes      Resolved Hospital Problems   No resolved problems to display.       Discharged Condition: good    Disposition: Hospice/Home    Follow Up/Patient Instructions:   As previously scheduled    Medications:  Reconciled Home Medications:      Medication List      START taking these medications    isosorbide mononitrate 30 MG 24 hr tablet  Commonly known as:  IMDUR  Take 1 tablet (30 mg total) by mouth once daily.        CONTINUE taking these medications    aspirin 81 MG Chew  Take 1 tablet (81 mg total) by mouth once daily.     atorvastatin 40 MG tablet  Commonly known as:  LIPITOR  Take 1 tablet (40 mg total) by mouth once daily.     fluticasone-vilanterol 100-25 mcg/dose diskus inhaler  Commonly known as:  BREO  Inhale 1 puff into the lungs once daily. Controller     furosemide 20 MG tablet  Commonly known as:  LASIX  Take 1 tablet (20 mg total) by mouth once daily.     lisinopril 2.5 MG tablet  Commonly known as:   PRINIVIL,ZESTRIL  Take 1 tablet (2.5 mg total) by mouth once daily.     metoprolol succinate 25 MG 24 hr tablet  Commonly known as:  TOPROL-XL  Take 1/2 tablets (12.5 mg total) by mouth once daily.     nitroGLYCERIN 0.4 MG SL tablet  Commonly known as:  NITROSTAT  Place 1 tablet (0.4 mg total) under the tongue every 5 (five) minutes as needed for Chest pain (If chest pain is unrelieved by 3 doses, call MD).     * QUEtiapine 25 MG Tab  Commonly known as:  SEROQUEL  12.5mg po daily     * QUEtiapine 25 MG Tab  Commonly known as:  SEROQUEL  12.52mg po before dinner     * QUEtiapine 25 MG Tab  Commonly known as:  SEROQUEL  Take 1 tablet (25 mg total) by mouth every evening.     ticagrelor 90 mg tablet  Commonly known as:  BRILINTA  Take 1 tablet (90 mg total) by mouth 2 (two) times daily.     tiotropium 18 mcg inhalation capsule  Commonly known as:  SPIRIVA  Inhale 1 capsule (18 mcg total) into the lungs once daily. Controller         * This list has 3 medication(s) that are the same as other medications prescribed for you. Read the directions carefully, and ask your doctor or other care provider to review them with you.              Discharge Procedure Orders   Diet Cardiac     Activity as tolerated

## 2019-04-06 NOTE — PLAN OF CARE
Patient to d/c today to home with [facility]. Concha, daughter, states home DME has been delivered.    Family inquired about ambulance transport. [place] Informed family patient does not meet ambulance criteria today. Family will make arrangements to come  patient today. Verified patient on room air.    [facility] nurse[facility] called and updated on d/c date.   Kristie, bedside RN updated aswell. Pt okay to d/c home once family arrives.

## 2019-04-06 NOTE — PLAN OF CARE
VN note: VN cued into pt's room for introduction. VN informed pt that VN would be working closely along side bedside nurse, PCT, and the rest of care team and making rounds throughout the shift. Pt just looked up at the screen and turned back over and closed his eyes.  Allowed time for questions. Patient does not appear to be in any pain or discomfort at this time.     VN will continue to be available to patient and intervene prn.        04/06/19 1003   Type of Frequent Check   Type Patient Rounds  (Vn rounding)   Safety/Activity   Patient Rounds visualized patient;call light in patient/parent reach   Safety Promotion/Fall Prevention side rails raised x 3   Safety Precautions emergency equipment at bedside   Positioning   Body Position side-lying, right   Head of Bed (HOB) HOB at 20-30 degrees   Pain/Comfort/Sleep   Preferred Pain Scale FACES (Burnham-Pugh FACES Pain Rating Scale)   Comfort/Acceptable Pain Level 0   Pain Rating (0-10): Rest 0        Cardiac/Telemetry Details / Alarms   Cardiac/Telemetry Monitor On Yes   Cardiac/Telemetry Audible Yes   Cardiac/Telemetry Alarms Set Yes   Assessments (Pre/Post)   Level of Consciousness (AVPU) responds to voice

## 2020-06-22 NOTE — Clinical Note
Door to Balloon time 51 minutes    SPOKE WITH PT AND GIVEN THE NEUROPSYCHIC EVAL TEST RESULTS .  WITH MILD COGNITIVE DISORDER OTHERWISE IS FINE .

## (undated) DEVICE — HEMOSTAT VASC BAND REG 24CM

## (undated) DEVICE — TUBING HPCIL ROT M/F ADPT 48IN

## (undated) DEVICE — CATH ANG PIGTAIL 4FR INFINITY

## (undated) DEVICE — CATH GUIDE LINER  V3 6F

## (undated) DEVICE — GUIDEWIRE PROWATER .014X180CM

## (undated) DEVICE — Device

## (undated) DEVICE — GUIDE LAUNCHER 6FR EBU 3.5

## (undated) DEVICE — SYR MARK 7 ARTERION 150ML

## (undated) DEVICE — VISE RADIFOCUS MULTI TORQUE

## (undated) DEVICE — VALVE CONTROL COPILOT

## (undated) DEVICE — COVER PROBE US 5.5X58L NON LTX

## (undated) DEVICE — SET MICRO PUNCT 4FR/MPIS-401

## (undated) DEVICE — KIT INTRODUCER W/GUIDEWIRE

## (undated) DEVICE — CATH EAGLE EYE ST .014X20X150

## (undated) DEVICE — SEE MEDLINE ITEM 157187

## (undated) DEVICE — BLLN SC EUPHORA RX 2.00MMX15MM

## (undated) DEVICE — KIT LEFT HEART MANIFOLD CUSTOM

## (undated) DEVICE — KIT GLIDESHEATH SLEND 6FR 10CM

## (undated) DEVICE — BLLN NC EUPHORA RX 25 X15

## (undated) DEVICE — CATH RADIAL TIG 6FR 4.0 110CM

## (undated) DEVICE — GUIDEWIRE EMERALD .035IN 260CM

## (undated) DEVICE — INFLATOR ENCORE 26 BLLN INFL